# Patient Record
Sex: FEMALE | Race: WHITE | Employment: UNEMPLOYED | URBAN - METROPOLITAN AREA
[De-identification: names, ages, dates, MRNs, and addresses within clinical notes are randomized per-mention and may not be internally consistent; named-entity substitution may affect disease eponyms.]

---

## 2017-06-02 ENCOUNTER — HOSPITAL ENCOUNTER (EMERGENCY)
Age: 27
Discharge: HOME | End: 2017-06-02
Attending: EMERGENCY MEDICINE

## 2017-06-02 VITALS
RESPIRATION RATE: 18 BRPM | SYSTOLIC BLOOD PRESSURE: 131 MMHG | OXYGEN SATURATION: 98 % | HEART RATE: 80 BPM | HEIGHT: 61 IN | DIASTOLIC BLOOD PRESSURE: 86 MMHG | TEMPERATURE: 98.1 F | BODY MASS INDEX: 24.55 KG/M2 | WEIGHT: 130 LBS

## 2017-06-02 DIAGNOSIS — S02.5XXA CLOSED FRACTURE OF TOOTH, INITIAL ENCOUNTER: ICD-10-CM

## 2017-06-02 DIAGNOSIS — K08.89 PAIN, DENTAL: Primary | ICD-10-CM

## 2017-06-02 PROCEDURE — 99282 EMERGENCY DEPT VISIT SF MDM: CPT

## 2017-06-02 RX ORDER — TRAMADOL HYDROCHLORIDE 50 MG/1
50 TABLET ORAL EVERY 6 HOURS PRN
Qty: 12 TABLET | Refills: 0 | Status: SHIPPED | OUTPATIENT
Start: 2017-06-02 | End: 2020-02-01 | Stop reason: ALTCHOICE

## 2017-06-02 RX ORDER — PENICILLIN V POTASSIUM 500 MG/1
500 TABLET ORAL 3 TIMES DAILY
Qty: 30 TABLET | Refills: 0 | Status: SHIPPED | OUTPATIENT
Start: 2017-06-02 | End: 2017-06-12

## 2017-06-02 RX ORDER — NAPROXEN 500 MG/1
500 TABLET ORAL 2 TIMES DAILY
Qty: 20 TABLET | Refills: 0 | Status: SHIPPED | OUTPATIENT
Start: 2017-06-02 | End: 2020-02-01 | Stop reason: ALTCHOICE

## 2017-06-02 ASSESSMENT — PAIN DESCRIPTION - PAIN TYPE: TYPE: ACUTE PAIN

## 2017-06-02 ASSESSMENT — PAIN SCALES - GENERAL: PAINLEVEL_OUTOF10: 6

## 2017-06-02 ASSESSMENT — PAIN DESCRIPTION - LOCATION: LOCATION: TEETH

## 2017-06-02 ASSESSMENT — PAIN DESCRIPTION - DESCRIPTORS: DESCRIPTORS: ACHING;THROBBING

## 2017-10-22 ENCOUNTER — HOSPITAL ENCOUNTER (EMERGENCY)
Age: 27
Discharge: HOME | End: 2017-10-22
Attending: EMERGENCY MEDICINE

## 2017-10-22 VITALS
SYSTOLIC BLOOD PRESSURE: 135 MMHG | TEMPERATURE: 98.1 F | RESPIRATION RATE: 16 BRPM | HEART RATE: 74 BPM | WEIGHT: 125 LBS | BODY MASS INDEX: 23.62 KG/M2 | DIASTOLIC BLOOD PRESSURE: 103 MMHG | OXYGEN SATURATION: 100 %

## 2017-10-22 DIAGNOSIS — T40.1X1A ACCIDENTAL OVERDOSE OF HEROIN, INITIAL ENCOUNTER (HCC): Primary | ICD-10-CM

## 2017-10-22 PROCEDURE — 99283 EMERGENCY DEPT VISIT LOW MDM: CPT

## 2017-10-22 RX ORDER — ONDANSETRON 2 MG/ML
4 INJECTION INTRAMUSCULAR; INTRAVENOUS ONCE
Status: DISCONTINUED | OUTPATIENT
Start: 2017-10-22 | End: 2017-10-22 | Stop reason: HOSPADM

## 2017-10-22 ASSESSMENT — ENCOUNTER SYMPTOMS
VOMITING: 0
COUGH: 0
SHORTNESS OF BREATH: 0
ABDOMINAL PAIN: 0
NAUSEA: 0
DIARRHEA: 0

## 2017-10-22 NOTE — ED NOTES
Bed: 15  Expected date: 10/22/17  Expected time: 2:18 PM  Means of arrival: [facility]  Comments:  32 F - OD. A&O x4. Given 4mg Narcan nasally and 2mg IV.       Alondra Jimenez RN  10/1990

## 2017-10-22 NOTE — ED PROVIDER NOTES
[address] [facility] ED  eMERGENCY dEPARTMENT eNCOUnter      Pt Name: Huy Najera  MRN: [id number]  Олегgflata 1990  Date of evaluation: 10/22/2017  Provider: Dominik Licona [id number]       Chief Complaint   Patient presents with    Drug Overdose         HISTORY OF PRESENT ILLNESS  (Location/Symptom, Timing/Onset, Context/Setting, Quality, Duration, Modifying Factors, Severity.)   Huy Najera is a 32 y.o. female who presents to the emergency department After heroin overdose. Patient admits to recreational use of heroin. She was found apneic and when EMS arrived they did place an intravenous line and administered Narcan. After the Narcan the patient was immediately responsive. She arrives to the emergency department alert and oriented ×4 and is refusing treatment. She admits to this happening in the past and states \"I know my rights I want to leave. \"    Hospitals in [place]    Nursing Notes were reviewed and I agree. REVIEW OF SYSTEMS    (2-9 systems for level 4, 10 or more for level 5)     Review of Systems   Constitutional: Negative for chills, diaphoresis, fatigue and fever. HENT: Negative for congestion. Respiratory: Negative for cough and shortness of breath. Cardiovascular: Negative for chest pain and palpitations. Gastrointestinal: Negative for abdominal pain, diarrhea, nausea and vomiting. Genitourinary: Negative for dysuria and flank pain. Skin: Negative for rash. Neurological: Negative for dizziness, light-headedness and headaches. Except as noted above the remainder of the review of systems was reviewed and negative.        PAST MEDICAL HISTORY     Past Medical History:   Diagnosis Date    Anxiety     Chronic back pain     Ovarian cyst          SURGICAL HISTORY       Past Surgical History:   Procedure Laterality Date    BREAST SURGERY      mastitis    DILATION AND CURETTAGE OF UTERUS  9-2014    LAPAROSCOPY  1-2015         CURRENT MEDICATIONS Neurological: She is alert and oriented to person, place, and time. She has normal strength. Skin: Skin is warm, dry and intact. No rash noted. She is not diaphoretic. Nursing note and vitals reviewed. DIAGNOSTIC RESULTS     RADIOLOGY:   Non-plain film images such as CT, Ultrasound and MRI are read by the radiologist. Plain radiographic images are visualized and preliminarily interpreted by Kaden Hawkins CNP with the below findings:      Interpretation per the Radiologist below, if available at the time of this note:    No orders to display       LABS:  Labs Reviewed - No data to display    All other labs were within normal range or not returned as of this dictation. EMERGENCY DEPARTMENT COURSE and DIFFERENTIAL DIAGNOSIS/MDM:   Vitals:    Vitals:    10/22/17 1422   BP: (!) 135/103   Pulse: 74   Resp: 16   Temp: 98.1 °F (36.7 °C)   TempSrc: Oral   SpO2: 100%   Weight: 125 lb (56.7 kg)       ED Course        MDM on evaluation the patient is nontoxic and in no distress ambulating throughout the room. She does allow physical examination but refuses further evaluation or monitoring. She has already called for a sober party to come pick her up. We discussed observation in the emergency department and the patient refuses this or any type of medical evaluation. Patient does state that she knows the right and this is happened in the past.  She does wish to leave and is agreeable to waiting for a ride to come pick her up. Risks up to and including death were discussed with the patient, she verbalizes understanding of these risks. She'll be discharged against medical advice. PROCEDURES:    Procedures      FINAL IMPRESSION      1.  Accidental overdose of heroin, initial encounter          DISPOSITION/PLAN   DISPOSITION AMA    PATIENT REFERRED TO:  [facility]  [address] [facility] [address]  In 1 day      [id number]  [phone]  In 1 day        DISCHARGE MEDICATIONS:  New Prescriptions    No medications on file       (Please note that portions of this note were completed with a voice recognition program.  Efforts were made to edit the dictations but occasionally words are mis-transcribed.)    Char Hall, [address] [facility] [address], [place]  10/22/17 1436    Attending Supervisory Note/Shared Visit   I have personally performed a face to face diagnostic evaluation on this patient. I have reviewed the mid-levels findings and agree.   History and Exam by me shows Heroin overdose refusing treatment      Cheli Cote DO  Attending Emergency Physician         Cheli Cote DO  10/22/17 8146

## 2020-02-01 ENCOUNTER — HOSPITAL ENCOUNTER (EMERGENCY)
Age: 30
Discharge: HOME | End: 2020-02-01
Attending: STUDENT IN AN ORGANIZED HEALTH CARE EDUCATION/TRAINING PROGRAM
Payer: COMMERCIAL

## 2020-02-01 VITALS
HEART RATE: 82 BPM | HEIGHT: 62 IN | SYSTOLIC BLOOD PRESSURE: 125 MMHG | OXYGEN SATURATION: 97 % | BODY MASS INDEX: 22.08 KG/M2 | WEIGHT: 120 LBS | RESPIRATION RATE: 17 BRPM | TEMPERATURE: 98.4 F | DIASTOLIC BLOOD PRESSURE: 58 MMHG

## 2020-02-01 PROCEDURE — 99282 EMERGENCY DEPT VISIT SF MDM: CPT

## 2020-02-01 PROCEDURE — 6370000000 HC RX 637 (ALT 250 FOR IP): Performed by: STUDENT IN AN ORGANIZED HEALTH CARE EDUCATION/TRAINING PROGRAM

## 2020-02-01 RX ORDER — TRAMADOL HYDROCHLORIDE 50 MG/1
50 TABLET ORAL EVERY 6 HOURS PRN
Qty: 12 TABLET | Refills: 0 | Status: SHIPPED | OUTPATIENT
Start: 2020-02-01 | End: 2020-02-04

## 2020-02-01 RX ORDER — NAPROXEN 500 MG/1
500 TABLET ORAL 2 TIMES DAILY
Qty: 20 TABLET | Refills: 0 | Status: SHIPPED | OUTPATIENT
Start: 2020-02-01 | End: 2020-12-04

## 2020-02-01 RX ORDER — CLINDAMYCIN HYDROCHLORIDE 150 MG/1
450 CAPSULE ORAL ONCE
Status: COMPLETED | OUTPATIENT
Start: 2020-02-01 | End: 2020-02-01

## 2020-02-01 RX ORDER — CLINDAMYCIN HYDROCHLORIDE 150 MG/1
300 CAPSULE ORAL 4 TIMES DAILY
Qty: 80 CAPSULE | Refills: 0 | Status: SHIPPED | OUTPATIENT
Start: 2020-02-01 | End: 2020-02-11

## 2020-02-01 RX ADMIN — CLINDAMYCIN HYDROCHLORIDE 450 MG: 150 CAPSULE ORAL at 15:38

## 2020-02-01 ASSESSMENT — ENCOUNTER SYMPTOMS
FACIAL SWELLING: 1
CHEST TIGHTNESS: 0
ABDOMINAL PAIN: 0
SHORTNESS OF BREATH: 0
VOMITING: 0
COUGH: 0
DIARRHEA: 0
SINUS PRESSURE: 0
BACK PAIN: 0
TROUBLE SWALLOWING: 0

## 2020-02-01 ASSESSMENT — PAIN SCALES - WONG BAKER: WONGBAKER_NUMERICALRESPONSE: 2

## 2020-02-01 ASSESSMENT — PAIN DESCRIPTION - PAIN TYPE: TYPE: ACUTE PAIN

## 2020-02-01 ASSESSMENT — PAIN - FUNCTIONAL ASSESSMENT: PAIN_FUNCTIONAL_ASSESSMENT: PREVENTS OR INTERFERES SOME ACTIVE ACTIVITIES AND ADLS

## 2020-02-01 ASSESSMENT — PAIN DESCRIPTION - FREQUENCY: FREQUENCY: CONTINUOUS

## 2020-02-01 ASSESSMENT — PAIN DESCRIPTION - LOCATION: LOCATION: FACE

## 2020-02-01 ASSESSMENT — PAIN SCALES - GENERAL: PAINLEVEL_OUTOF10: 6

## 2020-02-01 ASSESSMENT — PAIN DESCRIPTION - DESCRIPTORS: DESCRIPTORS: CONSTANT

## 2020-02-01 ASSESSMENT — PAIN DESCRIPTION - ORIENTATION: ORIENTATION: RIGHT

## 2020-02-01 ASSESSMENT — PAIN DESCRIPTION - ONSET: ONSET: ON-GOING

## 2020-02-01 ASSESSMENT — PAIN DESCRIPTION - PROGRESSION: CLINICAL_PROGRESSION: GRADUALLY WORSENING

## 2020-02-01 NOTE — ED NOTES
Pt has swelling noted to right side of face; she states this started last night. Mucous membranes pink, moist, and intact. Lungs clear; heart sounds regular.      Boris Solomon RN  02/01/20 2586

## 2020-02-01 NOTE — ED PROVIDER NOTES
systems reviewed and are negative. Except as noted above the remainder of the review of systems was reviewed and negative. PAST MEDICAL HISTORY     Past Medical History:   Diagnosis Date    Anxiety     Chronic back pain     Ovarian cyst          SURGICALHISTORY       Past Surgical History:   Procedure Laterality Date    BREAST SURGERY      mastitis    DILATION AND CURETTAGE OF UTERUS  9-2014    LAPAROSCOPY  1-2015         CURRENT MEDICATIONS       Discharge Medication List as of 2/1/2020  3:48 PM          ALLERGIES     Patient has no known allergies.     FAMILY HISTORY       Family History   Problem Relation Age of Onset    Cancer Paternal Grandmother         lung    High Blood Pressure Father     High Blood Pressure Maternal Grandmother     High Cholesterol Maternal Grandmother     High Blood Pressure Maternal Grandfather     High Cholesterol Maternal Grandfather           SOCIAL HISTORY       Social History     Socioeconomic History    Marital status: Single     Spouse name: None    Number of children: None    Years of education: None    Highest education level: None   Occupational History    None   Social Needs    Financial resource strain: None    Food insecurity:     Worry: None     Inability: None    Transportation needs:     Medical: None     Non-medical: None   Tobacco Use    Smoking status: Current Every Day Smoker     Packs/day: 0.50     Types: Cigarettes    Smokeless tobacco: Never Used   Substance and Sexual Activity    Alcohol use: No     Alcohol/week: 0.0 standard drinks    Drug use: Yes     Types: Opiates      Comment: heroin    Sexual activity: None   Lifestyle    Physical activity:     Days per week: None     Minutes per session: None    Stress: None   Relationships    Social connections:     Talks on phone: None     Gets together: None     Attends Denominational service: None     Active member of club or organization: None     Attends meetings of clubs or organizations: None     Relationship status: None    Intimate partner violence:     Fear of current or ex partner: None     Emotionally abused: None     Physically abused: None     Forced sexual activity: None   Other Topics Concern    None   Social History Narrative    None       SCREENINGS      @FLOW([id number])@      PHYSICAL EXAM    (up to 7 for level 4, 8 or more for level 5)     ED Triage Vitals   BP Temp Temp Source Pulse Resp SpO2 Height Weight   02/01/20 1434 02/01/20 1434 02/01/20 1434 02/01/20 1434 02/01/20 1434 02/01/20 1434 02/01/20 1539 02/01/20 1539   (!) 125/58 98.4 °F (36.9 °C) Oral 82 17 97 % 5' 2\" (1.575 m) 120 lb (54.4 kg)       Physical Exam  Vitals signs and nursing note reviewed. Constitutional:       General: She is awake. She is not in acute distress. Appearance: Normal appearance. She is well-developed and normal weight. She is not ill-appearing, toxic-appearing or diaphoretic. Comments: No photophobia. No phonophobia. HENT:      Head: Normocephalic and atraumatic. No Germain's sign. Right Ear: External ear normal.      Left Ear: External ear normal.      Nose: Nose normal. No congestion or rhinorrhea. Mouth/Throat:      Lips: Pink. Mouth: Mucous membranes are moist.      Dentition: Dental tenderness, gingival swelling and dental caries present. No gum lesions. Pharynx: Oropharynx is clear. No oropharyngeal exudate or posterior oropharyngeal erythema. Eyes:      General: No scleral icterus. Right eye: No foreign body or discharge. Left eye: No discharge. Extraocular Movements: Extraocular movements intact. Conjunctiva/sclera: Conjunctivae normal.      Left eye: No exudate. Pupils: Pupils are equal, round, and reactive to light. Neck:      Musculoskeletal: Normal range of motion and neck supple. No neck rigidity. Vascular: No JVD. Trachea: No tracheal deviation. Comments: No meningismus.   Cardiovascular: Rate and Rhythm: Normal rate and regular rhythm. Heart sounds: Normal heart sounds. Heart sounds not distant. No murmur. No friction rub. No gallop. Pulmonary:      Effort: Pulmonary effort is normal. No respiratory distress. Breath sounds: Normal breath sounds. No stridor. No wheezing, rhonchi or rales. Chest:      Chest wall: No tenderness. Abdominal:      General: Abdomen is flat. Bowel sounds are normal. There is no distension. Palpations: Abdomen is soft. There is no mass. Tenderness: There is no abdominal tenderness. There is no right CVA tenderness, left CVA tenderness, guarding or rebound. Hernia: No hernia is present. Musculoskeletal: Normal range of motion. General: No swelling, tenderness, deformity or signs of injury. Lymphadenopathy:      Head:      Right side of head: No submental adenopathy. Left side of head: No submental adenopathy. Skin:     General: Skin is warm and dry. Capillary Refill: Capillary refill takes less than 2 seconds. Coloration: Skin is not jaundiced or pale. Findings: No bruising, erythema, lesion or rash. Neurological:      General: No focal deficit present. Mental Status: She is alert and oriented to person, place, and time. Mental status is at baseline. Cranial Nerves: No cranial nerve deficit. Sensory: No sensory deficit. Motor: No weakness. Coordination: Coordination normal.      Deep Tendon Reflexes: Reflexes are normal and symmetric. Psychiatric:         Mood and Affect: Mood normal.         Behavior: Behavior normal. Behavior is cooperative. Thought Content:  Thought content normal.         Judgment: Judgment normal.         DIAGNOSTIC RESULTS     EKG: All EKG's are interpreted by the Emergency Department Physician who either signs or Co-signsthis chart in the absence of a cardiologist.        RADIOLOGY:   Non-plain filmimages such as CT, Ultrasound and MRI are read by the radiologist. Dario Kiran radiographic images are visualized and preliminarily interpreted by the emergency physician with the below findings:        Interpretation per the Radiologist below, if available at the time ofthis note:    No orders to display         ED BEDSIDE ULTRASOUND:   Performed by ED Physician - none    LABS:  Labs Reviewed - No data to display    All other labs were within normal range or not returned as of this dictation. EMERGENCY DEPARTMENT COURSE and DIFFERENTIAL DIAGNOSIS/MDM:   Vitals:    Vitals:    02/01/20 1434 02/01/20 1539   BP: (!) 125/58    Pulse: 82    Resp: 17    Temp: 98.4 °F (36.9 °C)    TempSrc: Oral    SpO2: 97%    Weight:  120 lb (54.4 kg)   Height:  5' 2\" (1.575 m)           MDM  Clinical pharmacy consult was obtained with Juan Francisco Lopes. Patient started on 450 mg of clindamycin in the ED orally and then she will switch to 300 mg every 6 hours for 10 days. Patient was advised if her symptoms are worsening such as drooling unable to swallow face swelling more that she is to return to the ER. The patient verbalized understanding care that was discussed with her on reexamination. She has no further questions. CONSULTS:  None    PROCEDURES:  Unless otherwise noted below, none     Procedures    FINAL IMPRESSION      1. Gingival abscess    2. Infected dental carries          DISPOSITION/PLAN   DISPOSITION Decision To Discharge 02/01/2020 03:45:10 PM      PATIENT REFERRED TO:  [facility]  [address] [facility] [id number] [phone]  Call in 2 days        DISCHARGE MEDICATIONS:  Discharge Medication List as of 2/1/2020  3:48 PM      START taking these medications    Details   clindamycin (CLEOCIN) 150 MG capsule Take 2 capsules by mouth 4 times daily for 10 days, Disp-80 capsule, R-0Print      traMADol (ULTRAM) 50 MG tablet Take 1 tablet by mouth every 6 hours as needed for Pain for up to 3 days. Intended supply: 3 days.  Take lowest dose possible to manage pain, Disp-12 tablet, R-0Print      naproxen (NAPROSYN) 500 MG tablet Take 1 tablet by mouth 2 times daily for 20 doses, Disp-20 tablet, R-0Print                (Please note that portions of this note were completed with a voice recognition program.  Efforts were made to edit the dictations but occasionally words are mis-transcribed.)    Mehdi Bear DO (electronically signed)  Attending Emergency Physician          Mehdi Bear DO  02/01/20 8622

## 2020-11-29 ENCOUNTER — HOSPITAL ENCOUNTER (EMERGENCY)
Age: 30
Discharge: HOME | End: 2020-11-29
Payer: COMMERCIAL

## 2020-11-29 ENCOUNTER — APPOINTMENT (OUTPATIENT)
Dept: GENERAL RADIOLOGY | Age: 30
End: 2020-11-29
Payer: COMMERCIAL

## 2020-11-29 VITALS
BODY MASS INDEX: 22.82 KG/M2 | HEIGHT: 62 IN | HEART RATE: 106 BPM | TEMPERATURE: 99.9 F | WEIGHT: 124 LBS | SYSTOLIC BLOOD PRESSURE: 120 MMHG | RESPIRATION RATE: 18 BRPM | DIASTOLIC BLOOD PRESSURE: 72 MMHG | OXYGEN SATURATION: 96 %

## 2020-11-29 PROCEDURE — 99284 EMERGENCY DEPT VISIT MOD MDM: CPT

## 2020-11-29 PROCEDURE — 71045 X-RAY EXAM CHEST 1 VIEW: CPT

## 2020-11-29 ASSESSMENT — ENCOUNTER SYMPTOMS
NAUSEA: 0
VOMITING: 0
ABDOMINAL PAIN: 0
WHEEZING: 0
SHORTNESS OF BREATH: 0
COUGH: 0
PHOTOPHOBIA: 0

## 2020-11-29 NOTE — ED TRIAGE NOTES
Pt [facility] with c/o accidental overdose. Pt states she took one to many percocets (5). Pt was given 2 mg of narcan and woke right up. Pt is A&O x 4. Pt has blood under her right nostril ( pt states that it is old and she had a scab). Pt's skin is p/w/d.  Pt's breathing and respirations are unlabored and equal.

## 2020-11-29 NOTE — ED PROVIDER NOTES
and headaches. All other systems reviewed and are negative. Except as noted above the remainder of the review of systems was reviewed and negative. PAST MEDICAL HISTORY     Past Medical History:   Diagnosis Date    Anxiety     Chronic back pain     Ovarian cyst          SURGICAL HISTORY       Past Surgical History:   Procedure Laterality Date    BREAST SURGERY      mastitis    DILATION AND CURETTAGE OF UTERUS  9-2014    LAPAROSCOPY  1-2015         CURRENT MEDICATIONS       Discharge Medication List as of 11/29/2020  7:00 PM      CONTINUE these medications which have NOT CHANGED    Details   naproxen (NAPROSYN) 500 MG tablet Take 1 tablet by mouth 2 times daily for 20 doses, Disp-20 tablet, R-0Print             ALLERGIES     Patient has no known allergies.     FAMILY HISTORY       Family History   Problem Relation Age of Onset    Cancer Paternal Grandmother         lung    High Blood Pressure Father     High Blood Pressure Maternal Grandmother     High Cholesterol Maternal Grandmother     High Blood Pressure Maternal Grandfather     High Cholesterol Maternal Grandfather           SOCIAL HISTORY       Social History     Socioeconomic History    Marital status: Single     Spouse name: None    Number of children: None    Years of education: None    Highest education level: None   Occupational History    None   Social Needs    Financial resource strain: None    Food insecurity     Worry: None     Inability: None    Transportation needs     Medical: None     Non-medical: None   Tobacco Use    Smoking status: Current Every Day Smoker     Packs/day: 0.50     Types: Cigarettes    Smokeless tobacco: Never Used   Substance and Sexual Activity    Alcohol use: No     Alcohol/week: 0.0 standard drinks    Drug use: Yes     Types: Opiates      Comment: heroin    Sexual activity: None   Lifestyle    Physical activity     Days per week: None     Minutes per session: None    Stress: None Relationships    Social connections     Talks on phone: None     Gets together: None     Attends Orthodox service: None     Active member of club or organization: None     Attends meetings of clubs or organizations: None     Relationship status: None    Intimate partner violence     Fear of current or ex partner: None     Emotionally abused: None     Physically abused: None     Forced sexual activity: None   Other Topics Concern    None   Social History Narrative    None       SCREENINGS                        PHYSICAL EXAM    (up to 7 for level 4, 8 or more for level 5)     ED Triage Vitals [11/29/20 1813]   BP Temp Temp Source Pulse Resp SpO2 Height Weight   120/72 99.9 °F (37.7 °C) Oral 106 18 96 % 5' 2\" (1.575 m) 124 lb (56.2 kg)       Physical Exam  Constitutional:       General: She is not in acute distress. Appearance: She is well-developed. She is not ill-appearing, toxic-appearing or diaphoretic. HENT:      Head: Normocephalic and atraumatic. Nose: Nose normal.      Mouth/Throat:      Mouth: Mucous membranes are moist.   Eyes:      Pupils: Pupils are equal, round, and reactive to light. Neck:      Musculoskeletal: Normal range of motion. Cardiovascular:      Rate and Rhythm: Normal rate and regular rhythm. Pulses: Normal pulses. Heart sounds: No murmur. No friction rub. No gallop. Pulmonary:      Effort: Pulmonary effort is normal. No respiratory distress. Breath sounds: Normal breath sounds. No wheezing, rhonchi or rales. Abdominal:      General: Bowel sounds are normal. There is no distension. Palpations: Abdomen is soft. Tenderness: There is no abdominal tenderness. There is no guarding or rebound. Musculoskeletal:         General: No swelling. Skin:     General: Skin is warm and dry. Capillary Refill: Capillary refill takes less than 2 seconds. Neurological:      General: No focal deficit present.       Mental Status: She is alert and oriented to person, place, and time. GCS: GCS eye subscore is 4. GCS verbal subscore is 5. GCS motor subscore is 6. Cranial Nerves: Cranial nerves are intact. Sensory: Sensation is intact. Motor: Motor function is intact. Coordination: Coordination is intact. Gait: Gait is intact. DIAGNOSTIC RESULTS     EKG: All EKG's are interpreted by the Emergency Department Physician who either signs or Co-signs this chart in the absence of a cardiologist.        RADIOLOGY:   Non-plain film images such as CT, Ultrasound and MRI are read by the radiologist. Plain radiographic images are visualized and preliminarily interpreted by the emergency physician with the below findings:        Interpretation per the Radiologist below, if available at the time of this note:    XR CHEST PORTABLE    (Results Pending)         ED BEDSIDE ULTRASOUND:   Performed by ED Physician - none    LABS:  Labs Reviewed - No data to display    All other labs were within normal range or not returned as of this dictation. EMERGENCY DEPARTMENT COURSE and DIFFERENTIAL DIAGNOSIS/MDM:   Vitals:    Vitals:    11/29/20 1813   BP: 120/72   Pulse: 106   Resp: 18   Temp: 99.9 °F (37.7 °C)   TempSrc: Oral   SpO2: 96%   Weight: 124 lb (56.2 kg)   Height: 5' 2\" (1.575 m)     MDM     Pt is a 26 yo F who presents to the ED with accidental drug overdose on percocet. She is afebrile and hemodynamically stable. CXR is negative for acute abnormality. Pt observed in the ED, uneventful. Remains neurologically intact with stable vitals. Stable for discharge. F/u with pcp in 1 day or return to the ED for worsening sx. Given warning signs for which she should return. Pt understands and agrees to plan, all questions answered. REASSESSMENT          CRITICAL CARE TIME   Total Critical Care time was 0 minutes, excluding separately reportable procedures.   There was a high probability of clinically significant/life threatening deterioration in the patient's condition which required my urgent intervention. CONSULTS:  None    PROCEDURES:  Unless otherwise noted below, none     Procedures        FINAL IMPRESSION      1. Accidental drug overdose, initial encounter          DISPOSITION/PLAN   DISPOSITION Decision To Discharge 11/29/2020 07:00:10 PM      PATIENT REFERRED TO:  Maik Escamilla, APRN - CNP  [address] [facility]  [phone]    Schedule an appointment as soon as possible for a visit in 1 day      [facility]) ED  [address] [address]  [phone]  Go to   As needed, If symptoms worsen      DISCHARGE MEDICATIONS:  Discharge Medication List as of 11/29/2020  7:00 PM        Controlled Substances Monitoring:     No flowsheet data found.     (Please note that portions of this note were completed with a voice recognition program.  Efforts were made to edit the dictations but occasionally words are mis-transcribed.)    Saloni Salazar PA-C (electronically signed)             Saloni Salazar PA-C  11/29/20 1916

## 2020-11-30 NOTE — ED NOTES
Pt understands discharge instructions.   Pt instructed to follow up with PCP   Prescriptions explained   Pt told to come back for new or worsening symptoms  No further questions         Fanta Barahona RN  11/29/20 4821

## 2020-12-04 ENCOUNTER — OFFICE VISIT (OUTPATIENT)
Dept: OBGYN CLINIC | Age: 30
End: 2020-12-04
Payer: COMMERCIAL

## 2020-12-04 VITALS
WEIGHT: 108 LBS | BODY MASS INDEX: 19.88 KG/M2 | SYSTOLIC BLOOD PRESSURE: 112 MMHG | DIASTOLIC BLOOD PRESSURE: 72 MMHG | HEIGHT: 62 IN

## 2020-12-04 PROCEDURE — G8420 CALC BMI NORM PARAMETERS: HCPCS | Performed by: ADVANCED PRACTICE MIDWIFE

## 2020-12-04 PROCEDURE — 99203 OFFICE O/P NEW LOW 30 MIN: CPT | Performed by: ADVANCED PRACTICE MIDWIFE

## 2020-12-04 PROCEDURE — G8484 FLU IMMUNIZE NO ADMIN: HCPCS | Performed by: ADVANCED PRACTICE MIDWIFE

## 2020-12-04 PROCEDURE — 99385 PREV VISIT NEW AGE 18-39: CPT | Performed by: ADVANCED PRACTICE MIDWIFE

## 2020-12-04 PROCEDURE — G8427 DOCREV CUR MEDS BY ELIG CLIN: HCPCS | Performed by: ADVANCED PRACTICE MIDWIFE

## 2020-12-04 PROCEDURE — 1036F TOBACCO NON-USER: CPT | Performed by: ADVANCED PRACTICE MIDWIFE

## 2020-12-04 RX ORDER — METRONIDAZOLE 500 MG/1
500 TABLET ORAL 2 TIMES DAILY
Qty: 14 TABLET | Refills: 1 | Status: SHIPPED | OUTPATIENT
Start: 2020-12-04 | End: 2020-12-11

## 2020-12-04 ASSESSMENT — PATIENT HEALTH QUESTIONNAIRE - PHQ9
1. LITTLE INTEREST OR PLEASURE IN DOING THINGS: 0
SUM OF ALL RESPONSES TO PHQ QUESTIONS 1-9: 0
2. FEELING DOWN, DEPRESSED OR HOPELESS: 0
SUM OF ALL RESPONSES TO PHQ9 QUESTIONS 1 & 2: 0
SUM OF ALL RESPONSES TO PHQ QUESTIONS 1-9: 0
SUM OF ALL RESPONSES TO PHQ QUESTIONS 1-9: 0

## 2020-12-04 NOTE — PROGRESS NOTES
Chief Complaint:     Ivanna Sanchez is a 27 y.o. female who presents here today for complaints of:      Chief Complaint   Patient presents with    New Patient    Dyspareunia     pt unable to feel the strings pt only had for two yrs last pap 2 yrs ago     History of Present Illness:     Ivanna Sanchez is a 27 y.o. female who presents for her annual exam.    · Concerns today:  Discomfort with intercourse, unable to locate IUD strings  · Prior Pap History:    · 12/19/14 - NILM  · Current contraceptive Method:  IUD, does not desire pregnancy within the next 12 months. Is there a possibility you could be pregnant? No  · Sexual health:    · Use of barrier protection - Yes  · Exposure to a partner with a known sexually transmitted disease within the last 90 days - No  · Gender of sexual partners - Male  · Number of sexual contacts within the past 12 months:  1  · Personal past history  of sexually transmitted diseases:  No  · Desires screening for sexually transmitted diseases:  Yes    IUD Surveillance - started a hormonal contraceptive method to relieve uncomfortable menstrual symptoms.  Currently using a Mirena IUD (placed 9/14/18), happy with method, but experiencing dyspareunia and feels it may be related to her IUD. She is unable to palpate IUD strings.  Since her IUD was placed, menses are now described as absent/minimal.  Denies mid-cycle spotting/bleeding. Denies postcoital bleeding.  Activities that aggravate:  None   She does not desire pregnancy within 12 months.  Risk Factors - Smoking status:  Former smoker. Denies history of hypertension, pulmonary embolus, venous thrombosis, and migraine headaches with aura.  Discussed expected changes in menstrual bleeding, side effects, non-contraceptive benefits, and the effect on future futility. Past Medical, Surgical, and Family History: Allergies:  Patient has no known allergies. No LMP recorded.  Patient has had an implant. Obstetrical History:  [id number]     Past Medical History:   Diagnosis Date    Anxiety     Chronic back pain     LGSIL on Pap smear of cervix 12/4/2020    Ovarian cyst      Past Surgical History:   Procedure Laterality Date    BREAST SURGERY      mastitis    DILATION AND CURETTAGE OF UTERUS  9-2014    LAPAROSCOPY  1-2015     Family History   Problem Relation Age of Onset    Cancer Paternal Grandmother         lung    High Blood Pressure Father     High Blood Pressure Maternal Grandmother     High Cholesterol Maternal Grandmother     High Blood Pressure Maternal Grandfather     High Cholesterol Maternal Grandfather      Medications:     No current outpatient medications on file prior to visit. No current facility-administered medications on file prior to visit. Review of Systems:     Review of Systems   Constitutional: Negative for activity change, appetite change, chills, diaphoresis, fatigue, fever and unexpected weight change. HENT: Negative for congestion, postnasal drip, rhinorrhea, sneezing, sore throat, trouble swallowing and voice change. Respiratory: Negative for cough and shortness of breath. Cardiovascular: Negative for chest pain. Gastrointestinal: Negative for abdominal pain, constipation, diarrhea, nausea and vomiting. Genitourinary: Negative for difficulty urinating, dyspareunia, dysuria, frequency, genital sores, menstrual problem, pelvic pain, vaginal bleeding, vaginal discharge and vaginal pain. Musculoskeletal: Negative for arthralgias and myalgias. Neurological: Negative for dizziness, syncope and headaches. Hematological: Negative for adenopathy. All other systems reviewed and are negative. Physical Exam:     Vitals:  /72   Ht 5' 2\" (1.575 m)   Wt 108 lb (49 kg)   BMI 19.75 kg/m²     Physical Exam  Vitals signs and nursing note reviewed. Constitutional:       General: She is not in acute distress.      Appearance: Normal mass, tenderness or fullness. Rectum: Normal. No mass or external hemorrhoid. Musculoskeletal: Normal range of motion. General: No swelling or deformity. Right lower leg: No edema. Left lower leg: No edema. Lymphadenopathy:      Cervical: No cervical adenopathy. Upper Body:      Right upper body: No supraclavicular, axillary or pectoral adenopathy. Left upper body: No supraclavicular, axillary or pectoral adenopathy. Lower Body: No right inguinal adenopathy. No left inguinal adenopathy. Skin:     General: Skin is warm and dry. Capillary Refill: Capillary refill takes less than 2 seconds. Coloration: Skin is not jaundiced or pale. Neurological:      General: No focal deficit present. Mental Status: She is alert and oriented to person, place, and time. Mental status is at baseline. Cranial Nerves: No cranial nerve deficit. Sensory: No sensory deficit. Motor: No weakness. Coordination: Coordination normal.      Gait: Gait normal.   Psychiatric:         Mood and Affect: Mood normal.         Behavior: Behavior normal.         Thought Content: Thought content normal.         Judgment: Judgment normal.       Assessment:      Diagnosis Orders   1. Pap smear, as part of routine gynecological examination  PAP SMEAR   2. Screening for human papillomavirus  PAP SMEAR   3. Intrauterine device surveillance  US PELVIS COMPLETE   4. Intrauterine contraceptive device threads lost, initial encounter     5. Dyspareunia in female  PAP SMEAR   6. Bacterial vaginitis  metroNIDAZOLE (FLAGYL) 500 MG tablet   7. Vaginal discharge     8. Screen for sexually transmitted diseases  PAP SMEAR   9. Vulval lesion       Plan:     1. Annual Exam  Pelvic exam revealed moderate vaginal discharge  Vaginal cultures and Pap collected    2.  IUD Surveillance, Dyspareunia, Unable to [employer]  Experiencing some dyspareunia, unable to palpate strings  IUD Order Specific Question:   HPV Requested?      Answer:   N/A     Comments:   16/18     Order Specific Question:   High Risk Patient     Answer:   N/A     Orders Placed This Encounter   Medications    metroNIDAZOLE (FLAGYL) 500 MG tablet     Sig: Take 1 tablet by mouth 2 times daily for 7 days     Dispense:  14 tablet     Refill:  [facility]

## 2020-12-14 DIAGNOSIS — N94.10 DYSPAREUNIA IN FEMALE: ICD-10-CM

## 2020-12-14 DIAGNOSIS — Z01.419 PAP SMEAR, AS PART OF ROUTINE GYNECOLOGICAL EXAMINATION: ICD-10-CM

## 2020-12-14 DIAGNOSIS — Z11.51 SCREENING FOR HUMAN PAPILLOMAVIRUS: ICD-10-CM

## 2020-12-14 DIAGNOSIS — Z11.3 SCREEN FOR SEXUALLY TRANSMITTED DISEASES: ICD-10-CM

## 2020-12-14 NOTE — LETTER
[facility]  Dept: [phone]  Dept Fax: [phone]        Yenifer Diaz  [address]  [facility] [place] [address]          12/29/2020    Dear Ms. Veliz: We were unable to reach you by phone. Please call our office at your earliest convenience. This message is regarding: Schedule an appointment. Please call between the hours of 8:30am and 4:00pm Monday through Friday if possible. Our number is [phone]  At the [place] location. Thank you.        Esau ALCANTARA CNM

## 2020-12-18 PROBLEM — R87.612 LGSIL ON PAP SMEAR OF CERVIX: Status: ACTIVE | Noted: 2020-12-04

## 2020-12-18 PROBLEM — R87.810 CERVICAL HIGH RISK HPV (HUMAN PAPILLOMAVIRUS) TEST POSITIVE: Status: ACTIVE | Noted: 2020-12-04

## 2020-12-18 NOTE — RESULT ENCOUNTER NOTE
Pap: LGSIL, HPV positive  Refer for colposcopy    It looks like this may be Kari's first abnormal Pap, please schedule a visit to discuss her results.

## 2020-12-23 ASSESSMENT — ENCOUNTER SYMPTOMS
DIARRHEA: 0
SHORTNESS OF BREATH: 0
COUGH: 0
VOICE CHANGE: 0
RHINORRHEA: 0
VOMITING: 0
TROUBLE SWALLOWING: 0
SORE THROAT: 0
CONSTIPATION: 0
ABDOMINAL PAIN: 0
NAUSEA: 0

## 2020-12-28 ENCOUNTER — TELEPHONE (OUTPATIENT)
Dept: OBGYN CLINIC | Age: 30
End: 2020-12-28

## 2020-12-28 NOTE — TELEPHONE ENCOUNTER
----- Message from MARICRUZ Dillon CNM sent at 12/18/2020  9:57 AM EST -----  Pap:  LGSIL, HPV positive  Refer for colposcopy    It looks like this may be Kari's first abnormal Pap, please schedule a visit to discuss her results.

## 2020-12-28 NOTE — TELEPHONE ENCOUNTER
Tried reaching  Out to patient to make aware of previous message. I left a total of 3 messages for the patient to contact the office and schedule an appointment with Tamiko White to go over her pap smear.

## 2021-08-12 LAB
ALBUMIN SERPL-MCNC: 3.8 G/DL (ref 3.5–4.6)
ALP BLD-CCNC: 137 U/L (ref 40–130)
ALT SERPL-CCNC: 9 U/L (ref 0–33)
ANION GAP SERPL CALCULATED.3IONS-SCNC: 11 MEQ/L (ref 9–15)
AST SERPL-CCNC: 13 U/L (ref 0–35)
BASOPHILS ABSOLUTE: 0 K/UL (ref 0–0.2)
BASOPHILS RELATIVE PERCENT: 0.3 %
BILIRUB SERPL-MCNC: 0.6 MG/DL (ref 0.2–0.7)
BUN BLDV-MCNC: 7 MG/DL (ref 6–20)
CALCIUM SERPL-MCNC: 8.9 MG/DL (ref 8.5–9.9)
CHLORIDE BLD-SCNC: 96 MEQ/L (ref 95–107)
CO2: 26 MEQ/L (ref 20–31)
CREAT SERPL-MCNC: 0.65 MG/DL (ref 0.5–0.9)
EOSINOPHILS ABSOLUTE: 0 K/UL (ref 0–0.7)
EOSINOPHILS RELATIVE PERCENT: 0.3 %
GFR AFRICAN AMERICAN: >60
GFR NON-AFRICAN AMERICAN: >60
GLOBULIN: 3.4 G/DL (ref 2.3–3.5)
GLUCOSE BLD-MCNC: 124 MG/DL (ref 70–99)
HCG, URINE, POC: NEGATIVE
HCT VFR BLD CALC: 36.1 % (ref 37–47)
HEMOGLOBIN: 12 G/DL (ref 12–16)
LACTIC ACID: 2.3 MMOL/L (ref 0.5–2.2)
LYMPHOCYTES ABSOLUTE: 2.3 K/UL (ref 1–4.8)
LYMPHOCYTES RELATIVE PERCENT: 17.9 %
Lab: NORMAL
MCH RBC QN AUTO: 25.8 PG (ref 27–31.3)
MCHC RBC AUTO-ENTMCNC: 33.2 % (ref 33–37)
MCV RBC AUTO: 77.8 FL (ref 82–100)
MONOCYTES ABSOLUTE: 1.1 K/UL (ref 0.2–0.8)
MONOCYTES RELATIVE PERCENT: 8.8 %
NEGATIVE QC PASS/FAIL: NORMAL
NEUTROPHILS ABSOLUTE: 9.4 K/UL (ref 1.4–6.5)
NEUTROPHILS RELATIVE PERCENT: 72.7 %
PDW BLD-RTO: 15.3 % (ref 11.5–14.5)
PLATELET # BLD: 247 K/UL (ref 130–400)
POSITIVE QC PASS/FAIL: NORMAL
POTASSIUM SERPL-SCNC: 4.1 MEQ/L (ref 3.4–4.9)
RBC # BLD: 4.64 M/UL (ref 4.2–5.4)
SODIUM BLD-SCNC: 133 MEQ/L (ref 135–144)
TOTAL PROTEIN: 7.2 G/DL (ref 6.3–8)
WBC # BLD: 12.9 K/UL (ref 4.8–10.8)

## 2021-08-12 PROCEDURE — 96375 TX/PRO/DX INJ NEW DRUG ADDON: CPT

## 2021-08-12 PROCEDURE — 83605 ASSAY OF LACTIC ACID: CPT

## 2021-08-12 PROCEDURE — 96361 HYDRATE IV INFUSION ADD-ON: CPT

## 2021-08-12 PROCEDURE — 96366 THER/PROPH/DIAG IV INF ADDON: CPT

## 2021-08-12 PROCEDURE — 99284 EMERGENCY DEPT VISIT MOD MDM: CPT

## 2021-08-12 PROCEDURE — 85025 COMPLETE CBC W/AUTO DIFF WBC: CPT

## 2021-08-12 PROCEDURE — 36415 COLL VENOUS BLD VENIPUNCTURE: CPT

## 2021-08-12 PROCEDURE — 96365 THER/PROPH/DIAG IV INF INIT: CPT

## 2021-08-12 PROCEDURE — 80053 COMPREHEN METABOLIC PANEL: CPT

## 2021-08-12 ASSESSMENT — PAIN DESCRIPTION - LOCATION: LOCATION: ARM

## 2021-08-12 ASSESSMENT — PAIN DESCRIPTION - PAIN TYPE: TYPE: ACUTE PAIN

## 2021-08-12 ASSESSMENT — PAIN SCALES - GENERAL: PAINLEVEL_OUTOF10: 7

## 2021-08-12 ASSESSMENT — PAIN DESCRIPTION - ORIENTATION: ORIENTATION: RIGHT;UPPER

## 2021-08-13 ENCOUNTER — APPOINTMENT (OUTPATIENT)
Dept: GENERAL RADIOLOGY | Age: 31
DRG: 710 | End: 2021-08-13
Payer: COMMERCIAL

## 2021-08-13 ENCOUNTER — ANESTHESIA (OUTPATIENT)
Dept: OPERATING ROOM | Age: 31
DRG: 710 | End: 2021-08-13
Payer: COMMERCIAL

## 2021-08-13 ENCOUNTER — ANESTHESIA EVENT (OUTPATIENT)
Dept: OPERATING ROOM | Age: 31
DRG: 710 | End: 2021-08-13
Payer: COMMERCIAL

## 2021-08-13 ENCOUNTER — HOSPITAL ENCOUNTER (INPATIENT)
Age: 31
LOS: 1 days | Discharge: LEFT BEFORE DISCHARGE | DRG: 710 | End: 2021-08-14
Attending: INTERNAL MEDICINE | Admitting: INTERNAL MEDICINE
Payer: COMMERCIAL

## 2021-08-13 VITALS — DIASTOLIC BLOOD PRESSURE: 50 MMHG | SYSTOLIC BLOOD PRESSURE: 92 MMHG | OXYGEN SATURATION: 100 %

## 2021-08-13 DIAGNOSIS — L02.419 CELLULITIS AND ABSCESS OF UPPER EXTREMITY: Primary | ICD-10-CM

## 2021-08-13 DIAGNOSIS — L03.119 CELLULITIS AND ABSCESS OF UPPER EXTREMITY: Primary | ICD-10-CM

## 2021-08-13 DIAGNOSIS — R65.10 SIRS (SYSTEMIC INFLAMMATORY RESPONSE SYNDROME) (HCC): ICD-10-CM

## 2021-08-13 PROBLEM — A41.9 SEPSIS (HCC): Status: ACTIVE | Noted: 2021-08-13

## 2021-08-13 LAB
ALBUMIN SERPL-MCNC: 2.9 G/DL (ref 3.5–4.6)
ALP BLD-CCNC: 110 U/L (ref 40–130)
ALT SERPL-CCNC: 8 U/L (ref 0–33)
AMPHETAMINE SCREEN, URINE: POSITIVE
ANION GAP SERPL CALCULATED.3IONS-SCNC: 11 MEQ/L (ref 9–15)
ANISOCYTOSIS: ABNORMAL
AST SERPL-CCNC: 10 U/L (ref 0–35)
BANDED NEUTROPHILS RELATIVE PERCENT: 9 % (ref 5–11)
BARBITURATE SCREEN URINE: ABNORMAL
BASOPHILS ABSOLUTE: 0 K/UL (ref 0–0.2)
BASOPHILS RELATIVE PERCENT: 0.2 %
BENZODIAZEPINE SCREEN, URINE: ABNORMAL
BILIRUB SERPL-MCNC: 0.4 MG/DL (ref 0.2–0.7)
BUN BLDV-MCNC: 6 MG/DL (ref 6–20)
CALCIUM SERPL-MCNC: 7.7 MG/DL (ref 8.5–9.9)
CANNABINOID SCREEN URINE: ABNORMAL
CHLORIDE BLD-SCNC: 102 MEQ/L (ref 95–107)
CO2: 23 MEQ/L (ref 20–31)
COCAINE METABOLITE SCREEN URINE: ABNORMAL
CREAT SERPL-MCNC: 0.48 MG/DL (ref 0.5–0.9)
EOSINOPHILS ABSOLUTE: 0 K/UL (ref 0–0.7)
EOSINOPHILS RELATIVE PERCENT: 0.5 %
GFR AFRICAN AMERICAN: >60
GFR NON-AFRICAN AMERICAN: >60
GLOBULIN: 3 G/DL (ref 2.3–3.5)
GLUCOSE BLD-MCNC: 122 MG/DL (ref 70–99)
HCT VFR BLD CALC: 29.6 % (ref 37–47)
HEMOGLOBIN: 9.9 G/DL (ref 12–16)
LACTIC ACID, SEPSIS: 0.7 MMOL/L (ref 0.5–1.9)
LACTIC ACID, SEPSIS: 1 MMOL/L (ref 0.5–1.9)
LYMPHOCYTES ABSOLUTE: 2.4 K/UL (ref 1–4.8)
LYMPHOCYTES RELATIVE PERCENT: 17 %
Lab: ABNORMAL
MAGNESIUM: 1.8 MG/DL (ref 1.7–2.4)
MCH RBC QN AUTO: 26.2 PG (ref 27–31.3)
MCHC RBC AUTO-ENTMCNC: 33.6 % (ref 33–37)
MCV RBC AUTO: 78 FL (ref 82–100)
METHADONE SCREEN, URINE: ABNORMAL
MONOCYTES ABSOLUTE: 1.1 K/UL (ref 0.2–0.8)
MONOCYTES RELATIVE PERCENT: 6.8 %
NEUTROPHILS ABSOLUTE: 10.8 K/UL (ref 1.4–6.5)
NEUTROPHILS RELATIVE PERCENT: 67 %
OPIATE SCREEN URINE: ABNORMAL
OXYCODONE URINE: ABNORMAL
PDW BLD-RTO: 15 % (ref 11.5–14.5)
PHENCYCLIDINE SCREEN URINE: ABNORMAL
PLATELET # BLD: 225 K/UL (ref 130–400)
PLATELET SLIDE REVIEW: NORMAL
POIKILOCYTES: ABNORMAL
POTASSIUM REFLEX MAGNESIUM: 3.2 MEQ/L (ref 3.4–4.9)
PROCALCITONIN: 0.5 NG/ML (ref 0–0.15)
PROMONOCYTES: 1 %
PROPOXYPHENE SCREEN: ABNORMAL
RBC # BLD: 3.79 M/UL (ref 4.2–5.4)
SARS-COV-2, NAAT: NOT DETECTED
SODIUM BLD-SCNC: 136 MEQ/L (ref 135–144)
TOTAL PROTEIN: 5.9 G/DL (ref 6.3–8)
WBC # BLD: 14.2 K/UL (ref 4.8–10.8)

## 2021-08-13 PROCEDURE — 7100000000 HC PACU RECOVERY - FIRST 15 MIN: Performed by: SURGERY

## 2021-08-13 PROCEDURE — 87075 CULTR BACTERIA EXCEPT BLOOD: CPT

## 2021-08-13 PROCEDURE — 96366 THER/PROPH/DIAG IV INF ADDON: CPT

## 2021-08-13 PROCEDURE — 83735 ASSAY OF MAGNESIUM: CPT

## 2021-08-13 PROCEDURE — 3600000013 HC SURGERY LEVEL 3 ADDTL 15MIN: Performed by: SURGERY

## 2021-08-13 PROCEDURE — 2580000003 HC RX 258: Performed by: SURGERY

## 2021-08-13 PROCEDURE — 6360000002 HC RX W HCPCS: Performed by: INTERNAL MEDICINE

## 2021-08-13 PROCEDURE — 73060 X-RAY EXAM OF HUMERUS: CPT

## 2021-08-13 PROCEDURE — 1210000000 HC MED SURG R&B

## 2021-08-13 PROCEDURE — 2580000003 HC RX 258: Performed by: STUDENT IN AN ORGANIZED HEALTH CARE EDUCATION/TRAINING PROGRAM

## 2021-08-13 PROCEDURE — 6360000002 HC RX W HCPCS: Performed by: NURSE PRACTITIONER

## 2021-08-13 PROCEDURE — G0378 HOSPITAL OBSERVATION PER HR: HCPCS

## 2021-08-13 PROCEDURE — 96375 TX/PRO/DX INJ NEW DRUG ADDON: CPT

## 2021-08-13 PROCEDURE — 2709999900 HC NON-CHARGEABLE SUPPLY: Performed by: SURGERY

## 2021-08-13 PROCEDURE — 36415 COLL VENOUS BLD VENIPUNCTURE: CPT

## 2021-08-13 PROCEDURE — 6360000002 HC RX W HCPCS: Performed by: SURGERY

## 2021-08-13 PROCEDURE — 3600000003 HC SURGERY LEVEL 3 BASE: Performed by: SURGERY

## 2021-08-13 PROCEDURE — 87040 BLOOD CULTURE FOR BACTERIA: CPT

## 2021-08-13 PROCEDURE — 3700000000 HC ANESTHESIA ATTENDED CARE: Performed by: SURGERY

## 2021-08-13 PROCEDURE — 6370000000 HC RX 637 (ALT 250 FOR IP): Performed by: INTERNAL MEDICINE

## 2021-08-13 PROCEDURE — 87077 CULTURE AEROBIC IDENTIFY: CPT

## 2021-08-13 PROCEDURE — 6370000000 HC RX 637 (ALT 250 FOR IP): Performed by: STUDENT IN AN ORGANIZED HEALTH CARE EDUCATION/TRAINING PROGRAM

## 2021-08-13 PROCEDURE — 10061 I&D ABSCESS COMP/MULTIPLE: CPT | Performed by: SURGERY

## 2021-08-13 PROCEDURE — 87205 SMEAR GRAM STAIN: CPT

## 2021-08-13 PROCEDURE — 3700000001 HC ADD 15 MINUTES (ANESTHESIA): Performed by: SURGERY

## 2021-08-13 PROCEDURE — 80307 DRUG TEST PRSMV CHEM ANLYZR: CPT

## 2021-08-13 PROCEDURE — 87635 SARS-COV-2 COVID-19 AMP PRB: CPT

## 2021-08-13 PROCEDURE — 87186 SC STD MICRODIL/AGAR DIL: CPT

## 2021-08-13 PROCEDURE — 6360000002 HC RX W HCPCS: Performed by: ANESTHESIOLOGY

## 2021-08-13 PROCEDURE — 2500000003 HC RX 250 WO HCPCS: Performed by: STUDENT IN AN ORGANIZED HEALTH CARE EDUCATION/TRAINING PROGRAM

## 2021-08-13 PROCEDURE — 80053 COMPREHEN METABOLIC PANEL: CPT

## 2021-08-13 PROCEDURE — 6370000000 HC RX 637 (ALT 250 FOR IP): Performed by: SURGERY

## 2021-08-13 PROCEDURE — 84145 PROCALCITONIN (PCT): CPT

## 2021-08-13 PROCEDURE — 7100000001 HC PACU RECOVERY - ADDTL 15 MIN: Performed by: SURGERY

## 2021-08-13 PROCEDURE — 96361 HYDRATE IV INFUSION ADD-ON: CPT

## 2021-08-13 PROCEDURE — 99222 1ST HOSP IP/OBS MODERATE 55: CPT | Performed by: SURGERY

## 2021-08-13 PROCEDURE — 87185 SC STD ENZYME DETCJ PER NZM: CPT

## 2021-08-13 PROCEDURE — 6360000002 HC RX W HCPCS: Performed by: STUDENT IN AN ORGANIZED HEALTH CARE EDUCATION/TRAINING PROGRAM

## 2021-08-13 PROCEDURE — 2580000003 HC RX 258: Performed by: INTERNAL MEDICINE

## 2021-08-13 PROCEDURE — 2580000003 HC RX 258: Performed by: NURSE PRACTITIONER

## 2021-08-13 PROCEDURE — 96365 THER/PROPH/DIAG IV INF INIT: CPT

## 2021-08-13 PROCEDURE — 99254 IP/OBS CNSLTJ NEW/EST MOD 60: CPT | Performed by: INTERNAL MEDICINE

## 2021-08-13 PROCEDURE — 85025 COMPLETE CBC W/AUTO DIFF WBC: CPT

## 2021-08-13 PROCEDURE — 0K970ZZ DRAINAGE OF RIGHT UPPER ARM MUSCLE, OPEN APPROACH: ICD-10-PCS | Performed by: SURGERY

## 2021-08-13 PROCEDURE — 87070 CULTURE OTHR SPECIMN AEROBIC: CPT

## 2021-08-13 PROCEDURE — 83605 ASSAY OF LACTIC ACID: CPT

## 2021-08-13 RX ORDER — 0.9 % SODIUM CHLORIDE 0.9 %
1000 INTRAVENOUS SOLUTION INTRAVENOUS ONCE
Status: COMPLETED | OUTPATIENT
Start: 2021-08-13 | End: 2021-08-13

## 2021-08-13 RX ORDER — ACETAMINOPHEN 325 MG/1
650 TABLET ORAL EVERY 6 HOURS PRN
Status: DISCONTINUED | OUTPATIENT
Start: 2021-08-13 | End: 2021-08-14 | Stop reason: HOSPADM

## 2021-08-13 RX ORDER — ACETAMINOPHEN 500 MG
1000 TABLET ORAL ONCE
Status: COMPLETED | OUTPATIENT
Start: 2021-08-13 | End: 2021-08-13

## 2021-08-13 RX ORDER — SODIUM CHLORIDE 0.9 % (FLUSH) 0.9 %
10 SYRINGE (ML) INJECTION PRN
Status: DISCONTINUED | OUTPATIENT
Start: 2021-08-13 | End: 2021-08-14 | Stop reason: HOSPADM

## 2021-08-13 RX ORDER — MIDAZOLAM HYDROCHLORIDE 1 MG/ML
INJECTION INTRAMUSCULAR; INTRAVENOUS PRN
Status: DISCONTINUED | OUTPATIENT
Start: 2021-08-13 | End: 2021-08-13 | Stop reason: SDUPTHER

## 2021-08-13 RX ORDER — POTASSIUM CHLORIDE 20 MEQ/1
40 TABLET, EXTENDED RELEASE ORAL ONCE
Status: COMPLETED | OUTPATIENT
Start: 2021-08-13 | End: 2021-08-13

## 2021-08-13 RX ORDER — FENTANYL CITRATE 50 UG/ML
50 INJECTION, SOLUTION INTRAMUSCULAR; INTRAVENOUS EVERY 10 MIN PRN
Status: DISCONTINUED | OUTPATIENT
Start: 2021-08-13 | End: 2021-08-13 | Stop reason: HOSPADM

## 2021-08-13 RX ORDER — CLINDAMYCIN PHOSPHATE 600 MG/50ML
600 INJECTION INTRAVENOUS EVERY 8 HOURS
Status: DISCONTINUED | OUTPATIENT
Start: 2021-08-13 | End: 2021-08-13

## 2021-08-13 RX ORDER — MAGNESIUM HYDROXIDE 1200 MG/15ML
LIQUID ORAL CONTINUOUS PRN
Status: COMPLETED | OUTPATIENT
Start: 2021-08-13 | End: 2021-08-13

## 2021-08-13 RX ORDER — ACETAMINOPHEN 650 MG/1
650 SUPPOSITORY RECTAL EVERY 6 HOURS PRN
Status: DISCONTINUED | OUTPATIENT
Start: 2021-08-13 | End: 2021-08-14 | Stop reason: HOSPADM

## 2021-08-13 RX ORDER — LIDOCAINE HYDROCHLORIDE 10 MG/ML
1 INJECTION, SOLUTION EPIDURAL; INFILTRATION; INTRACAUDAL; PERINEURAL
Status: ACTIVE | OUTPATIENT
Start: 2021-08-13 | End: 2021-08-13

## 2021-08-13 RX ORDER — DIPHENHYDRAMINE HYDROCHLORIDE 50 MG/ML
12.5 INJECTION INTRAMUSCULAR; INTRAVENOUS
Status: DISCONTINUED | OUTPATIENT
Start: 2021-08-13 | End: 2021-08-13 | Stop reason: HOSPADM

## 2021-08-13 RX ORDER — SODIUM CHLORIDE 0.9 % (FLUSH) 0.9 %
5-40 SYRINGE (ML) INJECTION EVERY 12 HOURS SCHEDULED
Status: DISCONTINUED | OUTPATIENT
Start: 2021-08-13 | End: 2021-08-14 | Stop reason: HOSPADM

## 2021-08-13 RX ORDER — HYDROCODONE BITARTRATE AND ACETAMINOPHEN 5; 325 MG/1; MG/1
2 TABLET ORAL PRN
Status: DISCONTINUED | OUTPATIENT
Start: 2021-08-13 | End: 2021-08-13 | Stop reason: HOSPADM

## 2021-08-13 RX ORDER — HYDROCODONE BITARTRATE AND ACETAMINOPHEN 5; 325 MG/1; MG/1
1 TABLET ORAL EVERY 6 HOURS PRN
Status: DISCONTINUED | OUTPATIENT
Start: 2021-08-13 | End: 2021-08-14 | Stop reason: HOSPADM

## 2021-08-13 RX ORDER — ONDANSETRON 2 MG/ML
4 INJECTION INTRAMUSCULAR; INTRAVENOUS
Status: DISCONTINUED | OUTPATIENT
Start: 2021-08-13 | End: 2021-08-13 | Stop reason: HOSPADM

## 2021-08-13 RX ORDER — SODIUM CHLORIDE 9 MG/ML
INJECTION, SOLUTION INTRAVENOUS CONTINUOUS
Status: DISCONTINUED | OUTPATIENT
Start: 2021-08-13 | End: 2021-08-14 | Stop reason: HOSPADM

## 2021-08-13 RX ORDER — MORPHINE SULFATE 2 MG/ML
1 INJECTION, SOLUTION INTRAMUSCULAR; INTRAVENOUS EVERY 4 HOURS PRN
Status: DISCONTINUED | OUTPATIENT
Start: 2021-08-13 | End: 2021-08-14 | Stop reason: HOSPADM

## 2021-08-13 RX ORDER — MEPERIDINE HYDROCHLORIDE 25 MG/ML
12.5 INJECTION INTRAMUSCULAR; INTRAVENOUS; SUBCUTANEOUS EVERY 5 MIN PRN
Status: DISCONTINUED | OUTPATIENT
Start: 2021-08-13 | End: 2021-08-13 | Stop reason: HOSPADM

## 2021-08-13 RX ORDER — HYDROCODONE BITARTRATE AND ACETAMINOPHEN 5; 325 MG/1; MG/1
1 TABLET ORAL PRN
Status: DISCONTINUED | OUTPATIENT
Start: 2021-08-13 | End: 2021-08-13 | Stop reason: HOSPADM

## 2021-08-13 RX ORDER — SODIUM CHLORIDE, SODIUM LACTATE, POTASSIUM CHLORIDE, CALCIUM CHLORIDE 600; 310; 30; 20 MG/100ML; MG/100ML; MG/100ML; MG/100ML
INJECTION, SOLUTION INTRAVENOUS CONTINUOUS
Status: DISCONTINUED | OUTPATIENT
Start: 2021-08-13 | End: 2021-08-13

## 2021-08-13 RX ORDER — SODIUM CHLORIDE 0.9 % (FLUSH) 0.9 %
10 SYRINGE (ML) INJECTION EVERY 12 HOURS SCHEDULED
Status: DISCONTINUED | OUTPATIENT
Start: 2021-08-13 | End: 2021-08-14 | Stop reason: HOSPADM

## 2021-08-13 RX ORDER — METOCLOPRAMIDE HYDROCHLORIDE 5 MG/ML
10 INJECTION INTRAMUSCULAR; INTRAVENOUS
Status: DISCONTINUED | OUTPATIENT
Start: 2021-08-13 | End: 2021-08-13 | Stop reason: HOSPADM

## 2021-08-13 RX ORDER — CLINDAMYCIN PHOSPHATE 600 MG/50ML
600 INJECTION INTRAVENOUS ONCE
Status: COMPLETED | OUTPATIENT
Start: 2021-08-13 | End: 2021-08-13

## 2021-08-13 RX ORDER — PROPOFOL 10 MG/ML
INJECTION, EMULSION INTRAVENOUS PRN
Status: DISCONTINUED | OUTPATIENT
Start: 2021-08-13 | End: 2021-08-13 | Stop reason: SDUPTHER

## 2021-08-13 RX ORDER — SODIUM CHLORIDE 9 MG/ML
25 INJECTION, SOLUTION INTRAVENOUS PRN
Status: DISCONTINUED | OUTPATIENT
Start: 2021-08-13 | End: 2021-08-14 | Stop reason: HOSPADM

## 2021-08-13 RX ORDER — FENTANYL CITRATE 50 UG/ML
INJECTION, SOLUTION INTRAMUSCULAR; INTRAVENOUS PRN
Status: DISCONTINUED | OUTPATIENT
Start: 2021-08-13 | End: 2021-08-13 | Stop reason: SDUPTHER

## 2021-08-13 RX ORDER — SODIUM CHLORIDE 0.9 % (FLUSH) 0.9 %
5-40 SYRINGE (ML) INJECTION PRN
Status: DISCONTINUED | OUTPATIENT
Start: 2021-08-13 | End: 2021-08-14 | Stop reason: HOSPADM

## 2021-08-13 RX ORDER — TRAMADOL HYDROCHLORIDE 50 MG/1
50 TABLET ORAL EVERY 6 HOURS PRN
Status: DISCONTINUED | OUTPATIENT
Start: 2021-08-13 | End: 2021-08-14 | Stop reason: HOSPADM

## 2021-08-13 RX ORDER — ONDANSETRON 2 MG/ML
INJECTION INTRAMUSCULAR; INTRAVENOUS PRN
Status: DISCONTINUED | OUTPATIENT
Start: 2021-08-13 | End: 2021-08-13 | Stop reason: SDUPTHER

## 2021-08-13 RX ORDER — MORPHINE SULFATE 2 MG/ML
4 INJECTION, SOLUTION INTRAMUSCULAR; INTRAVENOUS
Status: DISCONTINUED | OUTPATIENT
Start: 2021-08-13 | End: 2021-08-13

## 2021-08-13 RX ORDER — ONDANSETRON 2 MG/ML
4 INJECTION INTRAMUSCULAR; INTRAVENOUS ONCE
Status: COMPLETED | OUTPATIENT
Start: 2021-08-13 | End: 2021-08-13

## 2021-08-13 RX ADMIN — MORPHINE SULFATE 1 MG: 2 INJECTION, SOLUTION INTRAMUSCULAR; INTRAVENOUS at 10:32

## 2021-08-13 RX ADMIN — SODIUM CHLORIDE 1000 ML: 9 INJECTION, SOLUTION INTRAVENOUS at 05:00

## 2021-08-13 RX ADMIN — MORPHINE SULFATE 4 MG: 2 INJECTION, SOLUTION INTRAMUSCULAR; INTRAVENOUS at 00:44

## 2021-08-13 RX ADMIN — PIPERACILLIN AND TAZOBACTAM 3375 MG: 3; .375 INJECTION, POWDER, LYOPHILIZED, FOR SOLUTION INTRAVENOUS at 18:09

## 2021-08-13 RX ADMIN — PROPOFOL 200 MG: 10 INJECTION, EMULSION INTRAVENOUS at 11:51

## 2021-08-13 RX ADMIN — ONDANSETRON 4 MG: 2 INJECTION INTRAMUSCULAR; INTRAVENOUS at 00:42

## 2021-08-13 RX ADMIN — ONDANSETRON 4 MG: 2 INJECTION INTRAMUSCULAR; INTRAVENOUS at 11:51

## 2021-08-13 RX ADMIN — PIPERACILLIN AND TAZOBACTAM 3375 MG: 3; .375 INJECTION, POWDER, LYOPHILIZED, FOR SOLUTION INTRAVENOUS at 22:52

## 2021-08-13 RX ADMIN — FENTANYL CITRATE 100 MCG: 50 INJECTION, SOLUTION INTRAMUSCULAR; INTRAVENOUS at 11:51

## 2021-08-13 RX ADMIN — SODIUM CHLORIDE 1000 ML: 9 INJECTION, SOLUTION INTRAVENOUS at 00:44

## 2021-08-13 RX ADMIN — CLINDAMYCIN PHOSPHATE 600 MG: 600 INJECTION, SOLUTION INTRAVENOUS at 00:44

## 2021-08-13 RX ADMIN — ACETAMINOPHEN 1000 MG: 500 TABLET ORAL at 00:41

## 2021-08-13 RX ADMIN — Medication 10 ML: at 10:33

## 2021-08-13 RX ADMIN — POTASSIUM CHLORIDE 40 MEQ: 20 TABLET, EXTENDED RELEASE ORAL at 06:21

## 2021-08-13 RX ADMIN — VANCOMYCIN HYDROCHLORIDE 750 MG: 750 INJECTION, POWDER, LYOPHILIZED, FOR SOLUTION INTRAVENOUS at 16:41

## 2021-08-13 RX ADMIN — SODIUM CHLORIDE: 9 INJECTION, SOLUTION INTRAVENOUS at 16:40

## 2021-08-13 RX ADMIN — HYDROCODONE BITARTRATE AND ACETAMINOPHEN 1 TABLET: 5; 325 TABLET ORAL at 18:20

## 2021-08-13 RX ADMIN — VANCOMYCIN HYDROCHLORIDE 750 MG: 750 INJECTION, POWDER, LYOPHILIZED, FOR SOLUTION INTRAVENOUS at 22:57

## 2021-08-13 RX ADMIN — VANCOMYCIN HYDROCHLORIDE 750 MG: 750 INJECTION, POWDER, LYOPHILIZED, FOR SOLUTION INTRAVENOUS at 10:28

## 2021-08-13 RX ADMIN — SODIUM CHLORIDE: 9 INJECTION, SOLUTION INTRAVENOUS at 10:27

## 2021-08-13 RX ADMIN — MIDAZOLAM HYDROCHLORIDE 2 MG: 2 INJECTION, SOLUTION INTRAMUSCULAR; INTRAVENOUS at 11:51

## 2021-08-13 RX ADMIN — TRAMADOL HYDROCHLORIDE 50 MG: 50 TABLET, FILM COATED ORAL at 06:21

## 2021-08-13 ASSESSMENT — PULMONARY FUNCTION TESTS
PIF_VALUE: 4
PIF_VALUE: 3
PIF_VALUE: 1
PIF_VALUE: 18
PIF_VALUE: 4
PIF_VALUE: 18
PIF_VALUE: 4
PIF_VALUE: 1
PIF_VALUE: 21
PIF_VALUE: 4
PIF_VALUE: 5
PIF_VALUE: 1
PIF_VALUE: 3
PIF_VALUE: 4
PIF_VALUE: 5
PIF_VALUE: 4
PIF_VALUE: 23

## 2021-08-13 ASSESSMENT — PAIN SCALES - GENERAL
PAINLEVEL_OUTOF10: 7
PAINLEVEL_OUTOF10: 7
PAINLEVEL_OUTOF10: 5
PAINLEVEL_OUTOF10: 0
PAINLEVEL_OUTOF10: 0
PAINLEVEL_OUTOF10: 7
PAINLEVEL_OUTOF10: 7
PAINLEVEL_OUTOF10: 4

## 2021-08-13 ASSESSMENT — ENCOUNTER SYMPTOMS
BACK PAIN: 0
WHEEZING: 0
BLOOD IN STOOL: 0
RHINORRHEA: 0
CONSTIPATION: 0
ANAL BLEEDING: 0
GASTROINTESTINAL NEGATIVE: 1
ABDOMINAL DISTENTION: 0
RESPIRATORY NEGATIVE: 1
ABDOMINAL PAIN: 0
PHOTOPHOBIA: 0
COLOR CHANGE: 1
DIARRHEA: 0
SHORTNESS OF BREATH: 0
EYES NEGATIVE: 1
SORE THROAT: 0
ALLERGIC/IMMUNOLOGIC NEGATIVE: 1
NAUSEA: 1
COUGH: 0
NAUSEA: 0
VOMITING: 0

## 2021-08-13 ASSESSMENT — PAIN DESCRIPTION - LOCATION
LOCATION: ARM
LOCATION: ARM

## 2021-08-13 ASSESSMENT — PAIN DESCRIPTION - PAIN TYPE
TYPE: ACUTE PAIN
TYPE: ACUTE PAIN

## 2021-08-13 ASSESSMENT — PAIN DESCRIPTION - ORIENTATION
ORIENTATION: RIGHT
ORIENTATION: RIGHT

## 2021-08-13 ASSESSMENT — PAIN - FUNCTIONAL ASSESSMENT: PAIN_FUNCTIONAL_ASSESSMENT: 0-10

## 2021-08-13 NOTE — ED NOTES
Lab called for add on Prolactoin, confirmed they have enough specimen.       Lalo Mane RN  08/13/21 2042

## 2021-08-13 NOTE — CONSULTS
Pt Name: Steph eMrrill  MRN: [id number]  YOB: 1990  Date of evaluation: 8/13/2021  Primary Care Physician: MARICRUZ Santos CNP  Patient evaluated at the request of  Dr. Nichols  Reason for evaluation: Right upper arm abscess    IMPRESSIONS:  1. Right upper arm abscess, unclear etiology    PLANS  1. Incision and drainage today    The options of therapy were discussed and the patient agrees to the above procedure. The procedure  as well as potential risks and complications including but not exclusive to infection, blood loss, damage to surrounding structures and even requiring further surgery  were discussed. All questions are answered . The patient appears to understands and is agreeable to the surgery. SUBJECTIVE:  History of Chief Complaint:    Alean Goodpasture is a 27 y. o.female who presents to the emergency room yesterday with a 2-day history of right arm swelling. 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 She denies any insect bite. Her pain is 10 out of 10 and radiates to axilla. It is difficult for her to move her arm. She states she has had a temperature of 102. She has had a previous breast abscess but no other abscesses. No history of diabetes  Past Medical History   has a past medical history of Anxiety, Chronic back pain, LGSIL on Pap smear of cervix, and Ovarian cyst.  Past Surgical History   has a past surgical history that includes laparoscopy (1-2015); Dilation and curettage of uterus (9-2014); and Breast surgery ().   Medications  Prior to Admission medications    Not on File    Scheduled Meds:   sodium chloride flush  5-40 mL Intravenous 2 times per day    enoxaparin  40 mg Subcutaneous Daily    vancomycin (VANCOCIN) intermittent dosing (placeholder)   Other RX Placeholder    vancomycin  750 mg Intravenous Q8H     Continuous Infusions:   sodium chloride  sodium chloride       PRN Meds:.sodium chloride flush, sodium chloride, acetaminophen **OR** acetaminophen, morphine, traMADol  Allergies  has No Known Allergies. Family History  family history includes Cancer in her paternal grandmother; High Blood Pressure in her father, maternal grandfather, and maternal grandmother; High Cholesterol in her maternal grandfather and maternal grandmother. Social History   reports that she has quit smoking. Her smoking use included cigarettes. She smoked 0.50 packs per day. She has never used smokeless tobacco. She reports previous alcohol use. She reports that she does not use drugs. Review of Systems:  14 systems were reviewed and negative other than [place]    OBJECTIVE  CURRENT VITALS:  height is 5' 2\" (1.575 m) and weight is 105 lb (47.6 kg). Her oral temperature is 100 °F (37.8 °C). Her blood pressure is 105/52 (abnormal) and her pulse is 93. Her respiration is 18 and oxygen saturation is 96%. Temperature Range (24h):Temp: 100 °F (37.8 °C) Temp  Av.6 °F (38.1 °C)  Min: 100 °F (37.8 °C)  Max: 101 °F (38.3 °C)  BP Range (65F): Systolic (27GJO), QWZ:854 , Min:96 , KOQ:515     Diastolic (49HDH), LBH:32, Min:52, Max:71    Pulse Range (24h): Pulse  Av.2  Min: 68  Max: 115  Respiration Range (24h): Resp  Av.7  Min: 16  Max: 18  Current Pulse Ox (24h):  SpO2: 96 %  Pulse Ox Range (24h):  SpO2  Av.3 %  Min: 96 %  Max: 100 %  Oxygen Amount and Delivery:    CONSTITUTIONAL: Alert and oriented times 3, no acute distress and cooperative to examination with proper mood and affect. SKIN: Skin color, texture, turgor normal. No rashes or lesions. , no bruising  LYMPH: no cervical nodes, no inguinal nodes  HEENT: Head is normocephalic, atraumatic.  PERRLA, Mucous membranes are moist.   NECK: Supple, symmetrical, trachea midline, no adenopathy, thyroid symmetric, not enlarged and no tenderness, skin normal.  CHEST/LUNGS: chest symmetric with normal A/P diameter, normal respiratory rate and rhythm, lungs clear to auscultation without wheezes, rales or rhonchi. No accessory muscle use. CARDIOVASCULAR: Heart sounds are normal.  Regular rate and rhythm without murmur, gallop or rub. Normal S1 and S2. Carotid and femoral pulses 2+/4 and equal bilaterally. ABDOMEN: Normal shape. .  Normal bowel sounds. No bruits. Soft, nondistended, no masses or organomegaly. no evidence of hernia. Tenderness: absent. RECTAL: deferred, not clinically indicated  NEUROLOGIC: There are no focalizing motor or sensory deficits. CN II-XII are grossly intact. Benedetta Ou EXTREMITIES: no cyanosis, no clubbing and no edema. 4 cm fluctuant tender erythematous area of her right mid biceps region. No drainage  PYSCH:  Mood, affect and judgement are normal, alert and oriented x 3  LABS:  Recent Labs     08/12/21  2130 08/13/21  0505   WBC 12.9* 14.2*   HGB 12.0 9.9*   HCT 36.1* 29.6*    225   * 136   K 4.1 3.2*   CL 96 102   CO2 26 23   BUN 7 6   CREATININE 0.65 0.48*   MG  --  1.8   CALCIUM 8.9 7.7*   AST 13 10   ALT 9 8   BILITOT 0.6 0.4   LACTA 2.3*  --        RADIOLOGY:  I have personally reviewed the following films:      Thank you for the interesting evaluation. Further recommendations to follow.     Electronically signed by Rona Funez MD on 8/13/21 at 7:38 AM EDT

## 2021-08-13 NOTE — ANESTHESIA POSTPROCEDURE EVALUATION
Department of Anesthesiology  Postprocedure Note    Patient: Ced Fernandez  MRN: [id number]  YOB: 1990  Date of evaluation: 8/13/2021  Time:  12:21 PM     Procedure Summary     Date: 08/13/21 Room / Location: [facility] OR [facility]    Anesthesia Start: 1201 Anesthesia Stop:     Procedure: ARM INCISION AND DRAINAGE, RIGHT (Right Arm Upper) Diagnosis: (Right arm abscess)    Surgeons: Richie Calloway MD Responsible Provider: Bernadette Leonard MD    Anesthesia Type: general ASA Status: 3 - Emergent          Anesthesia Type: No value filed. Connor Phase I: Connor Score: 10    Connor Phase II:      Last vitals: Reviewed and per EMR flowsheets.        Anesthesia Post Evaluation    Patient location during evaluation: PACU  Patient participation: complete - patient participated  Level of consciousness: awake  Pain score: 0  Airway patency: patent  Nausea & Vomiting: no nausea  Complications: no  Cardiovascular status: hemodynamically stable  Respiratory status: face mask  Hydration status: euvolemic

## 2021-08-13 NOTE — ANESTHESIA PRE PROCEDURE
Department of Anesthesiology  Preprocedure Note       Name:  Kirill Batch   Age:  27 y.o.  :  1990                                          MRN:  [id number]         Date:  2021      Surgeon: Jonna Pressley):  Phuong Tovar MD    Procedure: Procedure(s):  ARM INCISION AND DRAINAGE, RIGHT    Medications prior to admission:   Prior to Admission medications    Not on File       Current medications:    Current Facility-Administered Medications   Medication Dose Route Frequency Provider Last Rate Last Admin    sodium chloride flush 0.9 % injection 5-40 mL  5-40 mL Intravenous 2 times per day Nicoletto Bolzan-Roche, APRN - CNP        sodium chloride flush 0.9 % injection 5-40 mL  5-40 mL Intravenous PRN Nicoletto Bolzan-Roche, APRN - CNP        0.9 % sodium chloride infusion  25 mL Intravenous PRN Nicoletto Bolzan-Roche, APRN - CNP        enoxaparin (LOVENOX) injection 40 mg  40 mg Subcutaneous Daily Nicoletto Bolzan-Roche, APRN - CNP        acetaminophen (TYLENOL) tablet 650 mg  650 mg Oral Q6H PRN Nicoletto Bolzan-Roche, APRN - CNP        Or    acetaminophen (TYLENOL) suppository 650 mg  650 mg Rectal Q6H PRN Nicoletto Bolzan-Roche, APRN - CNP        0.9 % sodium chloride infusion   Intravenous Continuous Nicoletto Bolzan-Roche, APRN - CNP        morphine (PF) injection 1 mg  1 mg Intravenous Q4H PRN Adrian Garcia MD        traMADol Elfida Reining) tablet 50 mg  50 mg Oral Q6H PRN Adrian Garcia MD   50 mg at 21 3936    vancomycin (VANCOCIN) intermittent dosing (placeholder)   Other RX Placeholder Nicoletto Bolzan-Roche, APRN - CNP        vancomycin (VANCOCIN) 750 mg in dextrose 5 % 250 mL IVPB  750 mg Intravenous Q8H Nicoletto Bolzan-Roche, APRN - CNP        meperidine (DEMEROL) injection 12.5 mg  12.5 mg Intravenous Q5 Min PRN Willy Rodriguez MD        fentaNYL (SUBLIMAZE) injection 50 mcg  50 mcg Intravenous Q10 Min PRN Willy Rodriguez MD        HYDROmorphone (DILAUDID) injection 0.5 mg  0.5 mg Intravenous Q10 Min PRN Toshia Stanley MD        HYDROcodone-acetaminophen [facility]) 5-325 MG per tablet 1 tablet  1 tablet Oral PRN Toshia Stanley MD        Or    HYDROcodone-acetaminophen [facility]) 5-325 MG per tablet 2 tablet  2 tablet Oral PRN Toshia Stanley MD        ondansetron [facility]) injection 4 mg  4 mg Intravenous Once PRN Toshia Stanley MD        metoclopramide [facility]) injection 10 mg  10 mg Intravenous Once PRN Toshia Stanley MD        diphenhydrAMINE (BENADRYL) injection 12.5 mg  12.5 mg Intravenous Once PRN Toshia Stanley MD           Allergies:  No Known Allergies    Problem List:    Patient Active Problem List   Diagnosis Code    Anxiety F41.9    Chronic back pain M54.9, G89.29    LGSIL on Pap smear of cervix R87.612    Cervical high risk HPV (human papillomavirus) test positive R87.810    Abscess of arm L02.419    Sepsis ([facility] [address] A41.9       Past Medical History:        Diagnosis Date    Anxiety     Chronic back pain     LGSIL on Pap smear of cervix 12/4/2020    Ovarian cyst        Past Surgical History:        Procedure Laterality Date    BREAST SURGERY      mastitis    DILATION AND CURETTAGE OF UTERUS  9-2014    LAPAROSCOPY  1-2015       Social History:    Social History     Tobacco Use    Smoking status: Former Smoker     Packs/day: 0.50     Types: Cigarettes    Smokeless tobacco: Never Used   Substance Use Topics    Alcohol use: Not Currently     Alcohol/week: 0.0 standard drinks                                Counseling given: Not Answered      Vital Signs (Current):   Vitals:    08/13/21 0444 08/13/21 0447 08/13/21 0545 08/13/21 0729   BP: (!) 105/52 (!) 105/52  (!) 101/49   Pulse: 93 93  93   Resp:  18  18   Temp:    99.5 °F (37.5 °C)   TempSrc:    Oral   SpO2:    99%   Weight:   105 lb (47.6 kg)    Height:                                                  BP Readings from Last 3 Encounters:   08/13/21 (!) 101/49   12/04/20 112/72   11/29/20 120/72       NPO Status:                                                                                 BMI:   Wt Readings from Last 3 Encounters:   08/13/21 105 lb (47.6 kg)   12/04/20 108 lb (49 kg)   11/29/20 124 lb (56.2 kg)     Body mass index is 19.2 kg/m². CBC:   Lab Results   Component Value Date    WBC 14.2 08/13/2021    RBC 3.79 08/13/2021    RBC 4.32 01/23/2012    HGB 9.9 08/13/2021    HCT 29.6 08/13/2021    MCV 78.0 08/13/2021    RDW 15.0 08/13/2021     08/13/2021       CMP:   Lab Results   Component Value Date     08/13/2021    K 3.2 08/13/2021     08/13/2021    CO2 23 08/13/2021    BUN 6 08/13/2021    CREATININE 0.48 08/13/2021    GFRAA >60.0 08/13/2021    LABGLOM >60.0 08/13/2021    GLUCOSE 122 08/13/2021    GLUCOSE 79 01/23/2012    PROT 5.9 08/13/2021    CALCIUM 7.7 08/13/2021    BILITOT 0.4 08/13/2021    ALKPHOS 110 08/13/2021    AST 10 08/13/2021    ALT 8 08/13/2021       POC Tests: No results for input(s): POCGLU, POCNA, POCK, POCCL, POCBUN, POCHEMO, POCHCT in the last 72 hours.     Coags: No results found for: PROTIME, INR, APTT    HCG (If Applicable):   Lab Results   Component Value Date    PREGTESTUR Negative 07/29/2015        ABGs: No results found for: PHART, PO2ART, UXX1NVE, LZK2OEP, BEART, R8TESAQK     Type & Screen (If Applicable):  No results found for: LABABO, LABRH    Drug/Infectious Status (If Applicable):  No results found for: HIV, HEPCAB    COVID-19 Screening (If Applicable): No results found for: COVID19        Anesthesia Evaluation  Patient summary reviewed and Nursing notes reviewed no history of anesthetic complications:   Airway: Mallampati: II  TM distance: >3 FB   Neck ROM: full  Mouth opening: > = 3 FB Dental: normal exam         Pulmonary:Negative Pulmonary ROS and normal exam                               Cardiovascular:Negative CV ROS                      Neuro/Psych:   Negative Neuro/Psych ROS GI/Hepatic/Renal: Neg GI/Hepatic/Renal ROS            Endo/Other: Negative Endo/Other ROS                    Abdominal:             Vascular: negative vascular ROS. Other Findings:             Anesthesia Plan      general     ASA 3 - emergent       Induction: intravenous. MIPS: Prophylactic antiemetics administered. Anesthetic plan and risks discussed with patient.                       Willy Rodriguez MD   8/13/2021

## 2021-08-13 NOTE — PROGRESS NOTES
Department of Internal Medicine  General Internal Medicine  Attending Progress Note      SUBJECTIVE:  Pt seen and examined. Surgical dressing over RUE as she just arrived from I&D. Feels good. No complaints    OBJECTIVE      Medications    Current Facility-Administered Medications: sodium chloride flush 0.9 % injection 5-40 mL, 5-40 mL, Intravenous, 2 times per day  sodium chloride flush 0.9 % injection 5-40 mL, 5-40 mL, Intravenous, PRN  0.9 % sodium chloride infusion, 25 mL, Intravenous, PRN  enoxaparin (LOVENOX) injection 40 mg, 40 mg, Subcutaneous, Daily  acetaminophen (TYLENOL) tablet 650 mg, 650 mg, Oral, Q6H PRN **OR** acetaminophen (TYLENOL) suppository 650 mg, 650 mg, Rectal, Q6H PRN  0.9 % sodium chloride infusion, , Intravenous, Continuous  morphine (PF) injection 1 mg, 1 mg, Intravenous, Q4H PRN  traMADol (ULTRAM) tablet 50 mg, 50 mg, Oral, Q6H PRN  vancomycin (VANCOCIN) intermittent dosing (placeholder), , Other, RX Placeholder  vancomycin (VANCOCIN) 750 mg in dextrose 5 % 250 mL IVPB, 750 mg, Intravenous, Q8H  sodium chloride flush 0.9 % injection 10 mL, 10 mL, Intravenous, 2 times per day  sodium chloride flush 0.9 % injection 10 mL, 10 mL, Intravenous, PRN  0.9 % sodium chloride infusion, 25 mL, Intravenous, PRN  lidocaine PF 1 % injection 1 mL, 1 mL, Intradermal, Once PRN  HYDROcodone-acetaminophen (NORCO) 5-325 MG per tablet 1 tablet, 1 tablet, Oral, Q6H PRN  piperacillin-tazobactam (ZOSYN) 3,375 mg in dextrose 5 % 50 mL IVPB extended infusion (mini-bag), 3,375 mg, Intravenous, Q8H  Physical    VITALS:  /72   Pulse 90   Temp 98.2 °F (36.8 °C)   Resp 21   Ht 5' 2\" (1.575 m)   Wt 105 lb (47.6 kg)   SpO2 98%   BMI 19.20 kg/m²   Constitutional: Awake and alert in no acute distress.  Lying in bed comfortably  Head: Normocephalic, atraumatic  Eyes: EOMI, PERRLA  ENT: moist mucous membranes  Neck: neck supple, trachea midline  Lungs: Good inspiratory effort, CTABL, no wheeze, no rhonchi, no rales  Heart: RRR, normal S1 and S2, no murmurs  GI: Soft, non-distended, non tender, no guarding, no rebound, +BS  MSK: RUE with surgical dressing and guaze in place c/d/i, no edema noted  Pulses: 2+ pulses bilaterally  Skin: warm, dry  Psych: appropriate affect     Data    CBC:   Lab Results   Component Value Date    WBC 14.2 08/13/2021    RBC 3.79 08/13/2021    RBC 4.32 01/23/2012    HGB 9.9 08/13/2021    HCT 29.6 08/13/2021    MCV 78.0 08/13/2021    MCH 26.2 08/13/2021    MCHC 33.6 08/13/2021    RDW 15.0 08/13/2021     08/13/2021    MPV 8.6 07/29/2015     CMP:    Lab Results   Component Value Date     08/13/2021    K 3.2 08/13/2021     08/13/2021    CO2 23 08/13/2021    BUN 6 08/13/2021    CREATININE 0.48 08/13/2021    GFRAA >60.0 08/13/2021    LABGLOM >60.0 08/13/2021    GLUCOSE 122 08/13/2021    GLUCOSE 79 01/23/2012    PROT 5.9 08/13/2021    LABALBU 2.9 08/13/2021    LABALBU 4.9 01/23/2012    CALCIUM 7.7 08/13/2021    BILITOT 0.4 08/13/2021    ALKPHOS 110 08/13/2021    AST 10 08/13/2021    ALT 8 08/13/2021       ASSESSMENT AND PLAN      # Sepsis 2/2 RUE abscess/cellulitis  - febrile, leukocytosis, elevated lactate  - sp I&D today by surgery - very extensive and severe abscess  - started on vanco. Added zosyn today per ID recs  - monitor cultures  - pain control    Disposition: Vanc/zosyn and awaiting cultures.       [facility] DO  Internal Medicine

## 2021-08-13 NOTE — BRIEF OP NOTE
Brief Postoperative Note      Patient: Charma Hodgkins  YOB: 1990  MRN: [id number]    Date of Procedure: 8/13/2021    Pre-Op Diagnosis: Right arm abscess    Post-Op Diagnosis: Same       Procedure(s):  ARM INCISION AND DRAINAGE, RIGHT    Surgeon(s):  Kisha Bal MD    Assistant:  First Assistant: Marce Da Silva    Anesthesia: General    Estimated Blood Loss (mL): Minimal    Complications: None    Specimens:   ID Type Source Tests Collected by Time Destination   1 : right upper arm culture Swab Arm CULTURE, SURGICAL Kisha Bal MD 8/13/2021 1215        Implants:  * No implants in log *      Drains: * No LDAs found *    Findings: Large subcutaneous and intramuscular abscess of the right biceps area    Electronically signed by Kisha Bal MD on 8/13/2021 at 12:21 PM

## 2021-08-13 NOTE — PROGRESS NOTES
Received pt from ED. Patient alert & oriented x4.pt has low grade fever 37.8 other Vital signs stable. Patient denies shortness of breath or chest pain. pt endorses pain in RUE. NP Monty Solis at bedside to assess. IV fluid bolus initiated. Safety maintained with call bell in reach. Will continue to Monitor.

## 2021-08-13 NOTE — ED NOTES
Pt in bed with eyes closed. Respirations are even and unlabored. No s/s of distress noted at this time. Will continue to monitor.         Lupe Castro RN  08/13/21 2163

## 2021-08-13 NOTE — CARE COORDINATION
TEAM ROUNDS COMPLETED. PT CURRENTLY AT INTERVENTION. ID CONSULTED. PT WILL LIKELY NEED LTAB, POSSIBLY A PICC. IV CHECK SENT. WILL MEET WITH PATIENT WHEN ARRIVES BACK TO UNIT.

## 2021-08-13 NOTE — CONSULTS
Infectious Disease     Patient Name: Zachary Worthington  Date: 8/13/2021  YOB: 1990  Medical Record Number: [id number]      Right upper extremity abscess deep to muscle        History of Present Illness:  Anxiety ovarian cyst chronic back pain  Breast abscess that required incision and drainage    Patient presents with right upper extremity swelling pain area of her right biopsy sepsis 2 days prior to admission fevers 102 on admission to the hospital    Denies IV drug use  The patient has had admission with overdose in the past    08/13/2021 taken to surgery  POSTOPERATIVE DIAGNOSIS:  Right arm intramuscular abscess. PROCEDURE:  Incision and drainage of abscess. EXAMINATION: XR HUMERUS RIGHT (MIN 2 VIEWS), 8/13/2021 12:56 AM        CLINICAL HISTORY:  abscess, r/o subcutaneous air        COMPARISON: None       TECHNIQUE: 3 views       RIGHT HUMERUS FINDINGS:     There are no lytic or sclerotic bone lesions.       There is no acute  fracture or subluxation. The joint spaces are preserved.        The soft tissues are within normal limits.  There are no radiopaque foreign bodies in the soft tissues.          Impression   There are no acute osseous injuries. Review of Systems   Constitutional: Positive for chills, diaphoresis and fever. HENT: Negative. Eyes: Negative. Respiratory: Negative. Cardiovascular: Negative. Gastrointestinal: Negative. Endocrine: Negative. Genitourinary: Negative. Skin: Positive for color change and wound. Neurological: Negative.         Review of Systems: All 14 review of systems negative other than as stated above    Social History     Tobacco Use    Smoking status: Former Smoker     Packs/day: 0.50     Types: Cigarettes    Smokeless tobacco: Never Used   Substance Use Topics    Alcohol use: Not Currently     Alcohol/week: 0.0 standard drinks    Drug use: Never     Types: Opiates      Comment: heroin         Past Medical History: Diagnosis Date    Anxiety     Chronic back pain     LGSIL on Pap smear of cervix 12/4/2020    Ovarian cyst            Past Surgical History:   Procedure Laterality Date    BREAST SURGERY      mastitis    DILATION AND CURETTAGE OF UTERUS  9-2014    LAPAROSCOPY  1-2015         No current facility-administered medications on file prior to encounter. No current outpatient medications on file prior to encounter. No Known Allergies      Family History   Problem Relation Age of Onset    Cancer Paternal Grandmother         lung    High Blood Pressure Father     High Blood Pressure Maternal Grandmother     High Cholesterol Maternal Grandmother     High Blood Pressure Maternal Grandfather     High Cholesterol Maternal Grandfather          Physical Exam:      Physical Exam  Constitutional:       General: She is not in acute distress. Appearance: Normal appearance. She is not ill-appearing. HENT:      Head: Normocephalic and atraumatic. Eyes:      Pupils: Pupils are equal, round, and reactive to light. Neck:      Vascular: No carotid bruit. Cardiovascular:      Heart sounds: Normal heart sounds. No murmur heard. Pulmonary:      Effort: Pulmonary effort is normal. No respiratory distress. Breath sounds: No wheezing, rhonchi or rales. Abdominal:      General: Abdomen is flat. There is no distension. Palpations: There is no mass. Tenderness: There is no abdominal tenderness. There is no right CVA tenderness, left CVA tenderness, guarding or rebound. Hernia: No hernia is present. Musculoskeletal:         General: Swelling and tenderness present. Arms:       Cervical back: Normal range of motion and neck supple. No rigidity or tenderness. Lymphadenopathy:      Cervical: No cervical adenopathy. Skin:     Findings: Erythema present. Neurological:      General: No focal deficit present. Mental Status: She is alert.          Blood pressure 111/72, pulse 90, temperature 98.2 °F (36.8 °C), resp. rate 21, height 5' 2\" (1.575 m), weight 105 lb (47.6 kg), SpO2 98 %, not currently breastfeeding.       .   Lab Results   Component Value Date    WBC 14.2 (H) 08/13/2021    HGB 9.9 (L) 08/13/2021    HCT 29.6 (L) 08/13/2021    MCV 78.0 (L) 08/13/2021     08/13/2021     Lab Results   Component Value Date     08/13/2021    K 3.2 08/13/2021     08/13/2021    CO2 23 08/13/2021    BUN 6 08/13/2021    CREATININE 0.48 08/13/2021    GLUCOSE 122 08/13/2021    GLUCOSE 79 01/23/2012    CALCIUM 7.7 08/13/2021                ASSESSMENT:  Patient Active Problem List   Diagnosis    Anxiety    Chronic back pain    LGSIL on Pap smear of cervix    Cervical high risk HPV (human papillomavirus) test positive    Abscess of arm    Sepsis ([facility] [address]         PLAN:    Right upper extremity abscess deep to muscle    Cultures pending    Continue vancomycin add Zosyn

## 2021-08-13 NOTE — CARE COORDINATION
IV BENEFIT REQUEST FORM    FAX FROM: [id number] [facility]                        [address] [facility]    REQUESTED BY: Electronically signed by Aneesh Irizarry RN on 2021 at 1:18 PM                                               RN/C3: PHONE: [phone])     DATE:/TIME OF REQUEST: 21  TIME: 1:18 PM      TO: Gary Gonzalez [address] TO: [phone]    PHONE: [phone]     THIS PATIENT HAS BEEN IDENTIFIED TO POSSIBLY NEED LONG TERM IV'S. PLEASE CHECK INSURANCE COVERAGE FOR THE FOLLOWING PT/DRUGS. PATIENT'S NAME: Mercy Collins                              [facility]   PATIENT'S : 1990  PATIENT ADDRESS: [facility] [address] [place] [address]  SSN:    ([id number])     PAYOR NAME:  Payor: [address] [place] [address]  [address] / Plan: [address] [place] [address]  [address] / Product Type: *No Product type* /     DRUG: (VANCOMYCIN)                         DOSE: (750MG)            FREQUENCY: Q (8) HR      __________ CHECK HERE IF PT HAS NO INSURANCE AND REQUESTING SELF PAY COST. *IF [facility] IS NOT A PROVIDER FOR THIS PATIENT, PLEASE FORWARD INFO VIA FAX TO [facility] @ [phone],(PHONE NUMBER: [phone]) TO RUN BENEFIT VERIFICATION AND NOTIFY THE ABOVE C3 OF THIS PLAN. (FAX FACE SHEET WITH DEMOGRAPHICS AND INSURANCE INFO WITH THIS FORM.)  PLEASE FAX BENEFIT INFO TO: THE [phone]    This message is intended only for the use of the individual or entity to which it is addressed and may contain information that is privileged, confidential, and exempt from disclosure under applicable law. If the reader of the notice is not intended recipient of the employee/agent responsible for delivering the message to the intended recipient, you are hereby notified than any dissemination, distribution or copying of this communication is strictly prohibited.  Please contact the sender for further instructions on handling the information.

## 2021-08-13 NOTE — FLOWSHEET NOTE
[phone]- Dr. Amy Blankenship made aware of pt tox screen being positive for amphetamines, also aware of patient Potassium being 3.2, no new orders received-[facility]  Electronically signed by Trisha Villa RN on 8/13/2021

## 2021-08-13 NOTE — ED NOTES
Report called to [id number], report given to [facility]. Transport notified.       Dionne Bai RN  08/13/21 3949

## 2021-08-13 NOTE — OP NOTE
[address]                      [facility], [address] [facility]                                OPERATIVE REPORT    PATIENT NAME: Lila Mcmullen                    :        1990  MED REC NO:   [id number]                            ROOM:       W480  ACCOUNT NO:   [de-identified]                           ADMIT DATE: 2021  PROVIDER:     Veda Tavarez MD    DATE OF PROCEDURE:  2021    LOCATION:  [facility]. PREOPERATIVE DIAGNOSIS:  Right arm abscess. POSTOPERATIVE DIAGNOSIS:  Right arm intramuscular abscess. PROCEDURE:  Incision and drainage of abscess. SURGEON:  Veda Tavarez MD    ANESTHESIA:  General.    COMPLICATIONS:  None. ESTIMATED BLOOD LOSS:  Minimal.    HISTORY:  The patient is a 69-year-old female who comes in with a right  upper arm abscess, which she claims is not due to any injection of  medication. She has never had an abscess there before. She claims she  caught the abscess from a neighbor. I discussed with her her options of  therapy and she is agreeable to incision and drainage. The procedure as  well as the risks and complications were discussed including, but not  exclusive to infection, blood loss, damage to surrounding structures,  and even the possibility of needing further surgery in the future were  all discussed. She understood and was agreeable to the procedure. OPERATIVE PROCEDURE:  The patient brought in the operative suite, placed  in the supine position where anesthesia was induced and she was  intubated. The right arm was placed out on armboard and it was prepped  and draped in a sterile fashion. Time-out called. The patient and  procedure were properly identified. There was a 4 cm fluctuant area  present on the right mid biceps region. This area was incised with a  cruciate incision and a large amount of purulent material came out under  pressure. It was a brownish colored fluid.   It was cultured and the  wound was then irrigated with saline solution and the abscess appeared  to go intramuscularly in the upper arm region. There was minimal blood  loss during the procedure, there was some oozing of the skin. After  irrigating with saline solution, it was packed with quarter-inch plain  Nu Gauze and a dry sterile dressing was placed over. She tolerated the  procedure well and I spoke with her mother after the procedure by phone.         Shelley Brady MD    D: 08/13/2021 12:31:11       T: 08/13/2021 12:34:09     [id number]  Job#: [id number]     Doc#: [id number]    CC:

## 2021-08-13 NOTE — PROGRESS NOTES
Received from or into pacu on a cart accompanied in by Dr Rosie Esquivel. Oral airway in place, O2 on at [facility] Breath sounds clear to anterior auscultation. SAO2 100%. MP RSR. Right arm dressing clean and dry, right fingers warm with brisk capillary refill noted. /right radial pulse palpable. Right arm elevated onto pillow.

## 2021-08-13 NOTE — ED NOTES
Pt in bed with eyes closed. Respirations are even and unlabored. No s/s of distress noted at this time. Will continue to monitor.         Jose Martinez RN  08/13/21 8510

## 2021-08-13 NOTE — PROGRESS NOTES
Pharmacy Note  Vancomycin Consult    Irma Bentley is a 27 y.o. female started on Vancomycin for SSTI; consult received from Gloria Parish NP to manage therapy. Also receiving the following antibiotics: clindamycin. SIRS (systemic inflammatory response syndrome) (HCC) [R65.10]  Abscess of arm [L02.419]  Cellulitis and abscess of upper extremity [L03.119, L02.419]  Allergies: Patient has no known allergies. Temp max: 101F    Cultures  No results for input(s): BC, BLOODCULT2, MRSAC, RESPCULTURE, LABGRAM, ORG, CXSURG, WNDABS, LABANAE, LABURIN, SPNEUAGU, HSVSRC, FLUA, FLUB, AFBSMEAR, CXST, FUNGUSSMEAR, LABLEGI, VRECX, CDIFPCR, [id number], GIAAGS, ROTA, FECLEU, COVID19 in the last 720 hours. Height: 5' 2\" (157.5 cm), Weight: 105 lb (47.6 kg), Body mass index is 19.2 kg/m². MRSA Nasal swab:N/a, does not meet criteria    Recent Labs     08/13/21  0505   CREATININE 0.48*   Estimated Creatinine Clearance: 129 mL/min (A) (based on SCr of 0.48 mg/dL (L)). .    Goal Trough Level: 10-15 mcg/mL, AUC < 500    Assessment/Plan:  Will initiate Vancomycin 750 mg IV every 8 hours. Anticipate random level 8/14 AM. Bayesian model predicts , trough 13.8, Pauc 67%, Pconc 23%, tox 9%. Timing of future trough levels may be adjusted based on culture results, renal function, and clinical response. Thank you for the consult. Will continue to follow. BARNEY Rubio Ph.  8/13/2021  6:02 AM

## 2021-08-13 NOTE — CARE COORDINATION
[facility] Case Management Initial Discharge Assessment    Met with Patient to discuss discharge plan. PCP: Davian Villafuerte, [facility]                                Date of Last Visit: June. 2021    If no PCP, list provided? Declined    Discharge Planning    Living Arrangements: independently at home    Who do you live with? Mother , 2 kids    Who helps you with your care:  self    If lives at home:     Do you have any barriers navigating in your home? no    Patient can perform ADL? Yes    Current Services (outpatient and in home) :  None    Dialysis: No    Is transportation available to get to your appointments? Yes    DME Equipment:  no    Respiratory equipment: None    Respiratory provider:  no     Pharmacy:  [facility] [address] [facility] with Medication Assistance Program?  No      Patient agreeable to [place] [address]? No    Patient agreeable to SNF/Rehab? No    Other discharge needs identified? N/A    Does Patient Have a High-Risk for Readmission Diagnosis (CHF, PN, MI, COPD)? No         Initial Discharge Plan? (Note: please see concurrent daily documentation for any updates after initial note).     Patient plans to return home     Readmission Risk              Risk of Unplanned Readmission:  8       DCCOP Completed  Electronically signed by KHRIS Bautista, NATIVIDAD on 8/13/2021 at 3:39 PM

## 2021-08-13 NOTE — ED PROVIDER NOTES
[address] [facility] ED  EMERGENCY DEPARTMENT ENCOUNTER      Pt Name: Josie Yo  MRN: [id number]  Олегgflata 1990  Date of evaluation: 8/12/2021  Provider: Bryan Reid Dr       Chief Complaint   Patient presents with    Abscess     to right inner bicep          HISTORY OF PRESENT ILLNESS   (Location/Symptom, Timing/Onset, Context/Setting, Quality, Duration, Modifying Factors, Severity)  Note limiting factors. Josie Yo is a 27 y.o. female who per chart review has pmhx of anxiety presents to the emergency department for evaluation of R arm swelling, erythema, pain x 2 days. Pt states she thinks she has an abscess. 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 She does have a history of abscesses as well. Redness has progressed significantly tonight. Pain 10/10. Pain radiates to R axilla Unable to move arm due to pain. + for nausea, fatigue, fever to Tmax of 102. Denies IV drug use. Took advil prior to arrival. Denies chills, vomiting, diarrhea, abd pain, chest pain, sob, cough, numbness, tingling, weakness. HPI    Nursing Notes were reviewed. REVIEW OF SYSTEMS    (2-9 systems for level 4, 10 or more for level 5)     Review of Systems   Constitutional: Positive for fatigue and fever. Negative for chills. HENT: Negative for congestion. Eyes: Negative for photophobia. Respiratory: Negative for cough, shortness of breath and wheezing. Cardiovascular: Negative for chest pain and palpitations. Gastrointestinal: Positive for nausea. Negative for abdominal distention, abdominal pain, anal bleeding, blood in stool, constipation, diarrhea and vomiting. Genitourinary: Negative for dysuria, frequency and hematuria. Musculoskeletal: Negative for myalgias. +R arm swelling, erythema, pain   Allergic/Immunologic: Negative for immunocompromised state. Neurological: Negative for dizziness, weakness and headaches.    All other systems reviewed and are negative. Except as noted above the remainder of the review of systems was reviewed and negative. PAST MEDICAL HISTORY     Past Medical History:   Diagnosis Date    Anxiety     Chronic back pain     LGSIL on Pap smear of cervix 12/4/2020    Ovarian cyst          SURGICAL HISTORY       Past Surgical History:   Procedure Laterality Date    BREAST SURGERY      mastitis    DILATION AND CURETTAGE OF UTERUS  9-2014    LAPAROSCOPY  1-2015         CURRENT MEDICATIONS       Previous Medications    No medications on file       ALLERGIES     Patient has no known allergies. FAMILY HISTORY       Family History   Problem Relation Age of Onset    Cancer Paternal Grandmother         lung    High Blood Pressure Father     High Blood Pressure Maternal Grandmother     High Cholesterol Maternal Grandmother     High Blood Pressure Maternal Grandfather     High Cholesterol Maternal Grandfather           SOCIAL HISTORY       Social History     Socioeconomic History    Marital status: Single     Spouse name: None    Number of children: None    Years of education: None    Highest education level: None   Occupational History    None   Tobacco Use    Smoking status: Former Smoker     Packs/day: 0.50     Types: Cigarettes    Smokeless tobacco: Never Used   Substance and Sexual Activity    Alcohol use: Not Currently     Alcohol/week: 0.0 standard drinks    Drug use: Never     Types: Opiates      Comment: heroin    Sexual activity: Yes     Partners: Female, Male     Birth control/protection: I.U.D. Other Topics Concern    None   Social History Narrative    None     Social Determinants of Health     Financial Resource Strain:     Difficulty of Paying Living Expenses:    Food Insecurity:     Worried About Running Out of Food in the Last Year:     [address] in the Last Year:    Transportation Needs:     Lack of Transportation (Medical):      Lack of Transportation (Non-Medical):    Physical Activity:     Days of Exercise per Week:     Minutes of Exercise per Session:    Stress:     Feeling of Stress :    Social Connections:     Frequency of Communication with Friends and Family:     Frequency of Social Gatherings with Friends and Family:     Attends Jain Services:     Active Member of Clubs or Organizations:     Attends Club or Organization Meetings:     Marital Status:    Intimate Partner Violence:     Fear of Current or Ex-Partner:     Emotionally Abused:     Physically Abused:     Sexually Abused:        SCREENINGS        Yeni Coma Scale  Eye Opening: Spontaneous  Best Verbal Response: Oriented  Best Motor Response: Obeys commands  Yeni Coma Scale Score: 15               PHYSICAL EXAM    (up to 7 for level 4, 8 or more for level 5)     ED Triage Vitals [08/12/21 2125]   BP Temp Temp Source Pulse Resp SpO2 Height Weight   121/65 101 °F (38.3 °C) Oral 114 16 100 % 5' 2\" (1.575 m) 120 lb (54.4 kg)       Physical Exam  Constitutional:       General: She is not in acute distress. Appearance: She is well-developed. She is ill-appearing. She is not toxic-appearing or diaphoretic. HENT:      Head: Normocephalic and atraumatic. Nose: Nose normal.      Mouth/Throat:      Mouth: Mucous membranes are moist.   Eyes:      Pupils: Pupils are equal, round, and reactive to light. Cardiovascular:      Rate and Rhythm: Normal rate and regular rhythm. Heart sounds: No murmur heard. No friction rub. No gallop. Pulmonary:      Effort: Pulmonary effort is normal.      Breath sounds: Normal breath sounds. No wheezing, rhonchi or rales. Abdominal:      General: Bowel sounds are normal. There is no distension. Palpations: Abdomen is soft. Tenderness: There is no abdominal tenderness. Musculoskeletal:         General: No swelling. Cervical back: Normal range of motion. Skin:     General: Skin is warm and dry. Comments: RUE neurovascularly intact. Neurological:      General: No focal deficit present. Mental Status: She is alert and oriented to person, place, and time. DIAGNOSTIC RESULTS     EKG: All EKG's are interpreted by the Emergency Department Physician who either signs or Co-signs this chart in the absence of a cardiologist.        RADIOLOGY:   Non-plain film images such as CT, Ultrasound and MRI are read by the radiologist. Plain radiographic images are visualized and preliminarily interpreted by the emergency physician with the below findings:    Interpretation per the Radiologist below, if available at the time of this note:    XR HUMERUS RIGHT (MIN 2 VIEWS)    (Results Pending)         ED BEDSIDE ULTRASOUND:   Performed by ED Physician - none    LABS:  Labs Reviewed   CBC WITH AUTO DIFFERENTIAL - Abnormal; Notable for the following components:       Result Value    WBC 12.9 (*)     Hematocrit 36.1 (*)     MCV 77.8 (*)     MCH 25.8 (*)     RDW 15.3 (*)     Neutrophils Absolute 9.4 (*)     Monocytes Absolute 1.1 (*)     All other components within normal limits   COMPREHENSIVE METABOLIC PANEL - Abnormal; Notable for the following components:    Sodium 133 (*)     Glucose 124 (*)     Alkaline Phosphatase 137 (*)     All other components within normal limits   LACTIC ACID, PLASMA - Abnormal; Notable for the following components:    Lactic Acid 2.3 (*)     All other components within normal limits   CULTURE, BLOOD 2   CULTURE, BLOOD 1   PROCALCITONIN   URINE DRUG SCREEN   POC PREGNANCY UR-QUAL       All other labs were within normal range or not returned as of this dictation.     EMERGENCY DEPARTMENT COURSE and DIFFERENTIAL DIAGNOSIS/MDM:   Vitals:    Vitals:    08/13/21 0230 08/13/21 0300 08/13/21 0323 08/13/21 0327   BP: 109/65 (!) 106/59     Pulse: 86   115   Resp:       Temp:   100.8 °F (38.2 °C)    TempSrc:   Oral    SpO2: 98% 97%     Weight:       Height:           MDM    Patient is a 25-year-old female who presents to the ED for evaluation of right upper extremity swelling, pain, erythema, fever. She is febrile to 101 and tachycardic to 114 on arrival.  She was given 1 L IV normal saline p.o. Tylenol IV morphine IV Zofran and IV clindamycin in the ED. No fluctuant areas of the RUE requiring I&D, all induration at this time. CBC remarkable for white count of 12.9. Lactic acid 2.3. Cultures procalcitonin drug screen pending. X-ray of the right upper extremity is negative for any subcutaneous air. Patient has remained tachycardic to 115 despite fluid bolus and improvement of fever to 100.8. Concern is for sepsis at this time. Due to patient meeting SIRS criteria with rapid progression of cellulitis patient will be admitted to the medical floor. Dr. Winsome Gutiérrez is accepting physician. Pt stable for admission. REASSESSMENT          CRITICAL CARE TIME   Total Critical Care time was 0 minutes, excluding separately reportable procedures. There was a high probability of clinically significant/life threatening deterioration in the patient's condition which required my urgent intervention. CONSULTS:  None    PROCEDURES:  Unless otherwise noted below, none     Procedures        FINAL IMPRESSION      1. Cellulitis and abscess of upper extremity    2. SIRS (systemic inflammatory response syndrome) ([facility]          DISPOSITION/PLAN   DISPOSITION Admitted 08/13/2021 03:36:49 AM      PATIENT REFERRED TO:  No follow-up provider specified. DISCHARGE MEDICATIONS:  New Prescriptions    No medications on file     Controlled Substances Monitoring:     No flowsheet data found.     (Please note that portions of this note were completed with a voice recognition program.  Efforts were made to edit the dictations but occasionally words are mis-transcribed.)    Kait Trevino PA-C (electronically signed)             Kait Trevino PA-C  08/13/21 [id number]

## 2021-08-13 NOTE — H&P
[facility] MEDICINE    HISTORY AND PHYSICAL EXAM    PATIENT NAME:  Michael Friedman    MRN:  [id number]  SERVICE DATE:  8/13/2021   SERVICE TIME:  4:44 AM    Primary Care Physician: MARICRUZ Charles CNP         SUBJECTIVE  CHIEF COMPLAINT: Fever and right arm pain    HPI: Patient being admitted for cellulitis and abscess of the right upper extremity. Patient is a pleasant, alert and oriented x3,  female, 72-year-old. Patient reports that she noticed redness and swelling of her right bicep 2 days ago. She also states that she is had fevers of 102 that she has been treating with ibuprofen. Patient denies any injury to the arm. Patient states that the pain in the arm is a 10/10. Patient denies any IV drug use. Patient denies any past IV drug use. However, medical record shows past admissions for overdose. Patient reports this is happened once before and she had to have an incision and drainage on her breast.  Patient denies any other medical problems. Patient denies tobacco or alcohol use.     PAST MEDICAL HISTORY:    Past Medical History:   Diagnosis Date    Anxiety     Chronic back pain     LGSIL on Pap smear of cervix 12/4/2020    Ovarian cyst      PAST SURGICAL HISTORY:    Past Surgical History:   Procedure Laterality Date    BREAST SURGERY      mastitis    DILATION AND CURETTAGE OF UTERUS  9-2014    LAPAROSCOPY  1-2015     FAMILY HISTORY:    Family History   Problem Relation Age of Onset    Cancer Paternal Grandmother         lung    High Blood Pressure Father     High Blood Pressure Maternal Grandmother     High Cholesterol Maternal Grandmother     High Blood Pressure Maternal Grandfather     High Cholesterol Maternal Grandfather      SOCIAL HISTORY:    Social History     Socioeconomic History    Marital status: Single     Spouse name: Not on file    Number of children: Not on file    Years of education: Not on file    Highest education level: Not on file Occupational History    Not on file   Tobacco Use    Smoking status: Former Smoker     Packs/day: 0.50     Types: Cigarettes    Smokeless tobacco: Never Used   Substance and Sexual Activity    Alcohol use: Not Currently     Alcohol/week: 0.0 standard drinks    Drug use: Never     Types: Opiates      Comment: heroin    Sexual activity: Yes     Partners: Female, Male     Birth control/protection: I.U.D. Other Topics Concern    Not on file   Social History Narrative    Not on file     Social Determinants of Health     Financial Resource Strain:     Difficulty of Paying Living Expenses:    Food Insecurity:     Worried About Running Out of Food in the Last Year:     [address] in the Last Year:    Transportation Needs:     Lack of Transportation (Medical):  Lack of Transportation (Non-Medical):    Physical Activity:     Days of Exercise per Week:     Minutes of Exercise per Session:    Stress:     Feeling of Stress :    Social Connections:     Frequency of Communication with Friends and Family:     Frequency of Social Gatherings with Friends and Family:     Attends Shinto Services:     Active Member of Clubs or Organizations:     Attends Club or Organization Meetings:     Marital Status:    Intimate Partner Violence:     Fear of Current or Ex-Partner:     Emotionally Abused:     Physically Abused:     Sexually Abused:      MEDICATIONS:   Prior to Admission medications    Not on File       ALLERGIES: Patient has no known allergies. REVIEW OF SYSTEM:   Review of Systems   Constitutional: Positive for fever. Negative for appetite change, fatigue and unexpected weight change. HENT: Negative for congestion, rhinorrhea and sore throat. Eyes: Negative. Negative for photophobia and visual disturbance. Respiratory: Negative for shortness of breath and wheezing. Cardiovascular: Negative for chest pain. Gastrointestinal: Negative for abdominal pain, nausea and vomiting. Endocrine: Negative. Negative for polydipsia, polyphagia and polyuria. Genitourinary: Negative for difficulty urinating, dysuria and pelvic pain. Musculoskeletal: Negative for back pain. Skin: Negative for rash. Erythema, edema, pain right upper extremity   Allergic/Immunologic: Negative. Neurological: Negative. Negative for dizziness, speech difficulty and weakness. Hematological: Negative. Psychiatric/Behavioral: Negative. Negative for behavioral problems and confusion. OBJECTIVE  PHYSICAL EXAM: BP (!) 105/52   Pulse 93   Temp 100 °F (37.8 °C) (Oral)   Resp 16   Ht 5' 2\" (1.575 m)   Wt 120 lb (54.4 kg)   SpO2 96%   BMI 21.95 kg/m²     Physical Exam  Vitals and nursing note reviewed. Constitutional:       General: She is not in acute distress. Appearance: She is well-developed. HENT:      Head: Normocephalic. Right Ear: External ear normal.      Left Ear: External ear normal.      Nose: Nose normal.      Mouth/Throat:      Mouth: Mucous membranes are moist.   Eyes:      Pupils: Pupils are equal, round, and reactive to light. Cardiovascular:      Rate and Rhythm: Normal rate and regular rhythm. Pulses: Normal pulses. Heart sounds: Normal heart sounds. Pulmonary:      Effort: Pulmonary effort is normal. No respiratory distress. Breath sounds: Normal breath sounds. No wheezing or rales. Abdominal:      General: Bowel sounds are normal. There is no distension. Palpations: Abdomen is soft. There is no mass. Tenderness: There is no abdominal tenderness. There is no guarding or rebound. Hernia: No hernia is present. Musculoskeletal:         General: Normal range of motion. Cervical back: Normal range of motion. Right lower leg: No edema. Left lower leg: No edema. Skin:     General: Skin is warm and dry. Capillary Refill: Capillary refill takes less than 2 seconds. Findings: Erythema present. Neurological:      Mental Status: She is alert and oriented to person, place, and time. DATA:     Diagnostic tests reviewed for today's visit:    Most recent labs and imaging results reviewed. LABS:    Recent Results (from the past 24 hour(s))   CBC Auto Differential    Collection Time: 08/12/21  9:30 PM   Result Value Ref Range    WBC 12.9 (H) 4.8 - 10.8 K/uL    RBC 4.64 4.20 - 5.40 M/uL    Hemoglobin 12.0 12.0 - 16.0 g/dL    Hematocrit 36.1 (L) 37.0 - 47.0 %    MCV 77.8 (L) 82.0 - 100.0 fL    MCH 25.8 (L) 27.0 - 31.3 pg    MCHC 33.2 33.0 - 37.0 %    RDW 15.3 (H) 11.5 - 14.5 %    Platelets 502 062 - 319 K/uL    Neutrophils % 72.7 %    Lymphocytes % 17.9 %    Monocytes % 8.8 %    Eosinophils % 0.3 %    Basophils % 0.3 %    Neutrophils Absolute 9.4 (H) 1.4 - 6.5 K/uL    Lymphocytes Absolute 2.3 1.0 - 4.8 K/uL    Monocytes Absolute 1.1 (H) 0.2 - 0.8 K/uL    Eosinophils Absolute 0.0 0.0 - 0.7 K/uL    Basophils Absolute 0.0 0.0 - 0.2 K/uL   Comprehensive Metabolic Panel    Collection Time: 08/12/21  9:30 PM   Result Value Ref Range    Sodium 133 (L) 135 - 144 mEq/L    Potassium 4.1 3.4 - 4.9 mEq/L    Chloride 96 95 - 107 mEq/L    CO2 26 20 - 31 mEq/L    Anion Gap 11 9 - 15 mEq/L    Glucose 124 (H) 70 - 99 mg/dL    BUN 7 6 - 20 mg/dL    CREATININE 0.65 0.50 - 0.90 mg/dL    GFR Non-African American >60.0 >60    GFR  >60.0 >60    Calcium 8.9 8.5 - 9.9 mg/dL    Total Protein 7.2 6.3 - 8.0 g/dL    Albumin 3.8 3.5 - 4.6 g/dL    Total Bilirubin 0.6 0.2 - 0.7 mg/dL    Alkaline Phosphatase 137 (H) 40 - 130 U/L    ALT 9 0 - 33 U/L    AST 13 0 - 35 U/L    Globulin 3.4 2.3 - 3.5 g/dL   Lactic Acid, Plasma    Collection Time: 08/12/21  9:30 PM   Result Value Ref Range    Lactic Acid 2.3 (H) 0.5 - 2.2 mmol/L       IMAGING:  No results found.     VTE Prophylaxis: low molecular weight heparin -  start    ASSESSMENT AND PLAN    Principal Problem:  Sepsis related to cellulitis/abscess: Cellulitis with developing abscess right upper extremity. WBC is 12.9. Lactic acid 2.3. Temperature 101 in ED. Tachycardia at 114. Patient given 1 L normal saline and given 600 mg clindamycin IV in ED. Blood culture sent x2. We will get procalcitonin. We will administer vancomycin pending ID recommendations and provide antipyretics. We will repeat lactic acid x2. We will hydrate per sepsis protocol. We will consult ID. Pending ID recommendations we will consult general surgery for incision and drainage. We will follow blood cultures. Plan of care discussed with: patient    SIGNATURE: MARICRUZ Henderson - CNP  DATE: August 13, 2021  TIME: 4:44 AM     WENDIE Sanderson MD - supervising physician

## 2021-08-13 NOTE — PROGRESS NOTES
Assessment completed this shift. Alert and oriented x4. Up independent in room. RUE +1 non pitting edema. Redness and warmth noted. Patient currently off floor for procedure.   Electronically signed by Sarah Kramer RN on 8/13/2021 at 11:23 AM

## 2021-08-13 NOTE — PROGRESS NOTES
Patient reurned to floor this afternoon. Dressing to right upper arm clean, dry, and intact.   Electronically signed by Nasra Felder RN on 8/13/2021 at 6:06 PM

## 2021-08-14 VITALS
RESPIRATION RATE: 16 BRPM | HEIGHT: 62 IN | OXYGEN SATURATION: 98 % | TEMPERATURE: 98.6 F | BODY MASS INDEX: 19.01 KG/M2 | SYSTOLIC BLOOD PRESSURE: 110 MMHG | WEIGHT: 103.3 LBS | DIASTOLIC BLOOD PRESSURE: 71 MMHG | HEART RATE: 78 BPM

## 2021-08-14 LAB
ALBUMIN SERPL-MCNC: 2.7 G/DL (ref 3.5–4.6)
ALP BLD-CCNC: 109 U/L (ref 40–130)
ALT SERPL-CCNC: 6 U/L (ref 0–33)
ANION GAP SERPL CALCULATED.3IONS-SCNC: 10 MEQ/L (ref 9–15)
AST SERPL-CCNC: 9 U/L (ref 0–35)
BASOPHILS ABSOLUTE: 0 K/UL (ref 0–0.2)
BASOPHILS RELATIVE PERCENT: 0.3 %
BILIRUB SERPL-MCNC: 0.4 MG/DL (ref 0.2–0.7)
BUN BLDV-MCNC: 4 MG/DL (ref 6–20)
CALCIUM SERPL-MCNC: 8 MG/DL (ref 8.5–9.9)
CHLORIDE BLD-SCNC: 105 MEQ/L (ref 95–107)
CO2: 23 MEQ/L (ref 20–31)
CREAT SERPL-MCNC: 0.45 MG/DL (ref 0.5–0.9)
EOSINOPHILS ABSOLUTE: 0.1 K/UL (ref 0–0.7)
EOSINOPHILS RELATIVE PERCENT: 0.8 %
GFR AFRICAN AMERICAN: >60
GFR NON-AFRICAN AMERICAN: >60
GLOBULIN: 3.1 G/DL (ref 2.3–3.5)
GLUCOSE BLD-MCNC: 100 MG/DL (ref 70–99)
HCT VFR BLD CALC: 26.7 % (ref 37–47)
HEMOGLOBIN: 8.9 G/DL (ref 12–16)
LYMPHOCYTES ABSOLUTE: 2.5 K/UL (ref 1–4.8)
LYMPHOCYTES RELATIVE PERCENT: 20.6 %
MAGNESIUM: 1.9 MG/DL (ref 1.7–2.4)
MCH RBC QN AUTO: 26.1 PG (ref 27–31.3)
MCHC RBC AUTO-ENTMCNC: 33.2 % (ref 33–37)
MCV RBC AUTO: 78.5 FL (ref 82–100)
MONOCYTES ABSOLUTE: 0.9 K/UL (ref 0.2–0.8)
MONOCYTES RELATIVE PERCENT: 7.3 %
NEUTROPHILS ABSOLUTE: 8.7 K/UL (ref 1.4–6.5)
NEUTROPHILS RELATIVE PERCENT: 71 %
PDW BLD-RTO: 15.2 % (ref 11.5–14.5)
PLATELET # BLD: 233 K/UL (ref 130–400)
POTASSIUM REFLEX MAGNESIUM: 3.4 MEQ/L (ref 3.4–4.9)
RBC # BLD: 3.4 M/UL (ref 4.2–5.4)
SODIUM BLD-SCNC: 138 MEQ/L (ref 135–144)
TOTAL PROTEIN: 5.8 G/DL (ref 6.3–8)
VANCOMYCIN RANDOM: 7.1 UG/ML (ref 10–40)
WBC # BLD: 12.3 K/UL (ref 4.8–10.8)

## 2021-08-14 PROCEDURE — 85025 COMPLETE CBC W/AUTO DIFF WBC: CPT

## 2021-08-14 PROCEDURE — 36415 COLL VENOUS BLD VENIPUNCTURE: CPT

## 2021-08-14 PROCEDURE — 2580000003 HC RX 258: Performed by: SURGERY

## 2021-08-14 PROCEDURE — 6360000002 HC RX W HCPCS: Performed by: INTERNAL MEDICINE

## 2021-08-14 PROCEDURE — 6370000000 HC RX 637 (ALT 250 FOR IP): Performed by: SURGERY

## 2021-08-14 PROCEDURE — 80053 COMPREHEN METABOLIC PANEL: CPT

## 2021-08-14 PROCEDURE — G0378 HOSPITAL OBSERVATION PER HR: HCPCS

## 2021-08-14 PROCEDURE — 83735 ASSAY OF MAGNESIUM: CPT

## 2021-08-14 PROCEDURE — 96366 THER/PROPH/DIAG IV INF ADDON: CPT

## 2021-08-14 PROCEDURE — 2580000003 HC RX 258: Performed by: ANESTHESIOLOGY

## 2021-08-14 PROCEDURE — 96372 THER/PROPH/DIAG INJ SC/IM: CPT

## 2021-08-14 PROCEDURE — 6360000002 HC RX W HCPCS: Performed by: SURGERY

## 2021-08-14 PROCEDURE — 2580000003 HC RX 258: Performed by: INTERNAL MEDICINE

## 2021-08-14 PROCEDURE — 80202 ASSAY OF VANCOMYCIN: CPT

## 2021-08-14 RX ADMIN — Medication 5 ML: at 08:36

## 2021-08-14 RX ADMIN — VANCOMYCIN HYDROCHLORIDE 1000 MG: 1 INJECTION, POWDER, LYOPHILIZED, FOR SOLUTION INTRAVENOUS at 09:38

## 2021-08-14 RX ADMIN — Medication 10 ML: at 08:35

## 2021-08-14 RX ADMIN — HYDROCODONE BITARTRATE AND ACETAMINOPHEN 1 TABLET: 5; 325 TABLET ORAL at 08:35

## 2021-08-14 RX ADMIN — PIPERACILLIN AND TAZOBACTAM 3375 MG: 3; .375 INJECTION, POWDER, LYOPHILIZED, FOR SOLUTION INTRAVENOUS at 15:37

## 2021-08-14 RX ADMIN — TRAMADOL HYDROCHLORIDE 50 MG: 50 TABLET, FILM COATED ORAL at 01:50

## 2021-08-14 RX ADMIN — ENOXAPARIN SODIUM 40 MG: 100 INJECTION SUBCUTANEOUS at 08:37

## 2021-08-14 RX ADMIN — PIPERACILLIN AND TAZOBACTAM 3375 MG: 3; .375 INJECTION, POWDER, LYOPHILIZED, FOR SOLUTION INTRAVENOUS at 06:18

## 2021-08-14 RX ADMIN — SODIUM CHLORIDE: 9 INJECTION, SOLUTION INTRAVENOUS at 01:51

## 2021-08-14 ASSESSMENT — PAIN DESCRIPTION - ORIENTATION: ORIENTATION: RIGHT

## 2021-08-14 ASSESSMENT — PAIN SCALES - GENERAL
PAINLEVEL_OUTOF10: 0
PAINLEVEL_OUTOF10: 6
PAINLEVEL_OUTOF10: 6
PAINLEVEL_OUTOF10: 7
PAINLEVEL_OUTOF10: 0

## 2021-08-14 ASSESSMENT — PAIN DESCRIPTION - LOCATION: LOCATION: ARM

## 2021-08-14 ASSESSMENT — PAIN DESCRIPTION - FREQUENCY: FREQUENCY: INTERMITTENT

## 2021-08-14 ASSESSMENT — PAIN DESCRIPTION - DESCRIPTORS: DESCRIPTORS: ACHING;SHARP

## 2021-08-14 ASSESSMENT — PAIN DESCRIPTION - PAIN TYPE: TYPE: ACUTE PAIN

## 2021-08-14 ASSESSMENT — PAIN DESCRIPTION - ONSET: ONSET: GRADUAL

## 2021-08-14 NOTE — FLOWSHEET NOTE
[address] [facility]  assessment completed. Pt :  Awake and resting in bed             Alert and oriented. Breakfast: set up with breakfast  RESP:   Even and non labored            Lung sounds:       clear                          Oxygen: room air  Complaints of: right upper arm discomfort  Pain: medicated with prn norco for pain and discomfort  IV: SL  TELE: none  Dressings: right upper arm, dry and intact  Precautions:              Falls:   20     Jim: 22  Chart and meds reviewed. Noted Labs: h 8.9 hct 26.7  Plan for today: pt asking about discharge plans, await physician rounds. Call light in reach. Electronically signed by Brittny Lemus RN on 8/14/2021 at 10:18 AM    [phone] iv removed. Pt states she is leaving AMA for childcare reasons. Taxi voucher requested. Dr DUNN [facility] [employer] updated. Electronically signed by Brittny Lemus RN on 8/14/2021 at 4:52 PM    1728 dinner completed. Personal belongings gathered. Pt talking to family member in lobby. Ambulatory off unit. Taxi transport cancelled.  Electronically signed by Brittny Lemus RN on 8/14/2021 at 5:35 PM

## 2021-08-14 NOTE — PROGRESS NOTES
HPI Comments: Janet Pinedo is a 39 y.o. female, pmhx significant for depression, PVC's, pneumonia who presents ambulatory to the ED c/o sudden onset palpitations and CP radiating to the jaw x 2 episodes with associated SOB, lightheadedness, and cough x 0800 this morning. Pt reports that she has a hx of PVC's and after climbing the flight of stairs to get to her job, she sat down at her desk and noticed the onset of her sxs, noting that they were similar to when she last had a run of PVC's. However, she states that today her sxs were more severe than when she has previously experienced them. The episode lasted for 5-10 minutes and then she felt better with some residual fatigue and lightheadedness. She decided to stay and continue working, but she experienced another episode later in the day that was not as severe but lasted for the same amount of time, so she decided to come into the ED [facility] ED for evaluation. Upon arrival, pt states that she continues to feel fatigued and lightheaded. Of note, pt has seen a cardiologist in the past and received a stress test with normal results. Additionally of note, she reports that she has had 2 episodes of chest tightness in the middle of the night over the past month, but thinks that it is possibly from sleeping in a bad position. Pt endorses taking her daily medications and denies any medication changes recently. She specifically denies increased caffeine intake, taking sudafed today, calf pain, taking hormone replacements or recent travel. PCP: None      Social Hx: -tobacco (former), -EtOH, -Illicit Drugs   FHx: pertinent negative for DVT/PE   Medication Allergies: codeine, morphine, prednisone       There are no other complaints, changes, or physical findings at this time. The history is provided by the patient.         Past Medical History:   Diagnosis Date    Arthritis     Bronchitis     Depression     Ear infection     Elevated hemoglobin A1c     Insomnia Pharmacy Vancomycin Consult     Vancomycin Day: 2  Current Dosin mg Q8h    Recent Labs     21  0505 21  0628   BUN 6 4*   CREATININE 0.48* 0.45*   WBC 14.2* 12.3*       Intake/Output Summary (Last 24 hours) at 2021 0850  Last data filed at 2021 0654  Gross per 24 hour   Intake 3614 ml   Output 1800 ml   Net 1814 ml     Cultures  Recent Labs     21  1215 21  1058 21  0106   BC  --   --  No Growth to date. Any change in status will be called. BLOODCULT2  --   --  No Growth to date. Any change in status will be called. LABGRAM Many WBC's  Many Gram positive cocci in pairs, short chains  Many Small Gram positive rods  Many Gram negative rods    --   --    COVID19  --  Not Detected  --      Height: 5' 2\" (157.5 cm), Weight: 103 lb 4.8 oz (46.9 kg), Body mass index is 18.89 kg/m². Estimated Creatinine Clearance: 135 mL/min (A) (based on SCr of 0.45 mg/dL (L)). .    Trough:  Recent Labs     21  0628   VANCORANDOM 7.1*      Assessment/Plan:  Current dosing with latest level of 7.1 has resulted in predicted levels of  at steady state which is subtherapeutic. [facility] is unable to find suitable regimen and will not calculate further AUC and toxicity calculation at this time. Given that this regimen is predicted to be slightly under target range of 400-500 will increase dosing to 1000 mg Q8h and obtain random level in 24-48 hours on  @ 0600.      Thank you,    Delphine Patricio, PharmD, BCPS, [facility]  Staff Pharmacist  2021 8:56 AM  Migraine     Obstructive sleep apnea     Pneumonia     Sinus infection        Past Surgical History:   Procedure Laterality Date    HX HYSTERECTOMY           History reviewed. No pertinent family history. Social History     Social History    Marital status: SINGLE     Spouse name: N/A    Number of children: N/A    Years of education: N/A     Occupational History    Not on file. Social History Main Topics    Smoking status: Former Smoker    Smokeless tobacco: Never Used    Alcohol use No    Drug use: No    Sexual activity: Not on file     Other Topics Concern    Not on file     Social History Narrative         ALLERGIES: Codeine; Morphine; and Prednisone    Review of Systems   Constitutional: Positive for fatigue. Negative for activity change, chills and fever. HENT: Negative for congestion and sore throat. Eyes: Negative for pain and redness. Respiratory: Positive for cough, chest tightness (x2 episodes in past month) and shortness of breath. Cardiovascular: Positive for chest pain (radiating to the jaw) and palpitations. Gastrointestinal: Negative for abdominal pain, diarrhea, nausea and vomiting. Genitourinary: Negative for dysuria, frequency and urgency. Musculoskeletal: Negative for back pain and neck pain. No calf pain    Skin: Negative for rash. Neurological: Positive for light-headedness. Negative for syncope and headaches. Psychiatric/Behavioral: Negative for confusion. All other systems reviewed and are negative. Patient Vitals for the past 12 hrs:   Temp Pulse Resp BP SpO2   06/13/17 1533 - 81 15 - 96 %   06/13/17 1530 - 79 17 (!) 145/94 -   06/13/17 1309 98.3 °F (36.8 °C) 83 20 (!) 198/94 98 %         Physical Exam   Constitutional: She is oriented to person, place, and time. She appears well-developed and well-nourished. No distress. HENT:   Head: Normocephalic.    Nose: Nose normal.   Mouth/Throat: Oropharynx is clear and moist. No oropharyngeal exudate. Eyes: Conjunctivae are normal. Pupils are equal, round, and reactive to light. No scleral icterus. Neck: Normal range of motion. Neck supple. No JVD present. No tracheal deviation present. No thyromegaly present. Cardiovascular: Normal rate, regular rhythm and intact distal pulses. Exam reveals no gallop and no friction rub. No murmur heard. Pulmonary/Chest: Effort normal and breath sounds normal. No stridor. No respiratory distress. She has no wheezes. She has no rales. Abdominal: Soft. Bowel sounds are normal. She exhibits no distension. There is no tenderness. There is no rebound and no guarding. Musculoskeletal: Normal range of motion. She exhibits no edema. Lymphadenopathy:     She has no cervical adenopathy. Neurological: She is alert and oriented to person, place, and time. No cranial nerve deficit. She exhibits normal muscle tone. Coordination normal.   Skin: Skin is warm and dry. No rash noted. She is not diaphoretic. No erythema. Psychiatric: She has a normal mood and affect. Her behavior is normal.   Nursing note and vitals reviewed.        MDM  Number of Diagnoses or Management Options  Diagnosis management comments: DDx: PVC's, dysrhythmia, anxiety, metabolic abnormality, ACS       Amount and/or Complexity of Data Reviewed  Clinical lab tests: reviewed and ordered  Tests in the radiology section of CPT®: ordered and reviewed  Tests in the medicine section of CPT®: reviewed and ordered  Review and summarize past medical records: yes  Independent visualization of images, tracings, or specimens: yes    Patient Progress  Patient progress: stable    ED Course       Procedures  DISCHARGE NOTE:  LABORATORY TESTS:  Recent Results (from the past 12 hour(s))   EKG, 12 LEAD, INITIAL    Collection Time: 06/13/17  1:02 PM   Result Value Ref Range    Ventricular Rate 80 BPM    Atrial Rate 80 BPM    P-R Interval 148 ms    QRS Duration 78 ms    Q-T Interval 404 ms    QTC Calculation (Bezet) 465 ms    Calculated P Axis -5 degrees    Calculated R Axis 29 degrees    Calculated T Axis 48 degrees    Diagnosis       Normal sinus rhythm  Normal ECG  When compared with ECG of 16-OCT-2015 06:47,  No significant change was found     CBC WITH AUTOMATED DIFF    Collection Time: 06/13/17  1:54 PM   Result Value Ref Range    WBC 6.9 3.6 - 11.0 K/uL    RBC 4.87 3.80 - 5.20 M/uL    HGB 14.4 11.5 - 16.0 g/dL    HCT 40.5 35.0 - 47.0 %    MCV 83.2 80.0 - 99.0 FL    MCH 29.6 26.0 - 34.0 PG    MCHC 35.6 30.0 - 36.5 g/dL    RDW 12.8 11.5 - 14.5 %    PLATELET 130 499 - 593 K/uL    NEUTROPHILS 55 32 - 75 %    LYMPHOCYTES 35 12 - 49 %    MONOCYTES 7 5 - 13 %    EOSINOPHILS 3 0 - 7 %    BASOPHILS 0 0 - 1 %    ABS. NEUTROPHILS 3.8 1.8 - 8.0 K/UL    ABS. LYMPHOCYTES 2.4 0.8 - 3.5 K/UL    ABS. MONOCYTES 0.5 0.0 - 1.0 K/UL    ABS. EOSINOPHILS 0.2 0.0 - 0.4 K/UL    ABS. BASOPHILS 0.0 0.0 - 0.1 K/UL   METABOLIC PANEL, COMPREHENSIVE    Collection Time: 06/13/17  1:54 PM   Result Value Ref Range    Sodium 134 (L) 136 - 145 mmol/L    Potassium 3.9 3.5 - 5.1 mmol/L    Chloride 99 97 - 108 mmol/L    CO2 28 21 - 32 mmol/L    Anion gap 7 5 - 15 mmol/L    Glucose 232 (H) 65 - 100 mg/dL    BUN 10 6 - 20 MG/DL    Creatinine 0.67 0.55 - 1.02 MG/DL    BUN/Creatinine ratio 15 12 - 20      GFR est AA >60 >60 ml/min/1.73m2    GFR est non-AA >60 >60 ml/min/1.73m2    Calcium 9.5 8.5 - 10.1 MG/DL    Bilirubin, total 0.4 0.2 - 1.0 MG/DL    ALT (SGPT) 52 12 - 78 U/L    AST (SGOT) 32 15 - 37 U/L    Alk.  phosphatase 90 45 - 117 U/L    Protein, total 7.3 6.4 - 8.2 g/dL    Albumin 3.8 3.5 - 5.0 g/dL    Globulin 3.5 2.0 - 4.0 g/dL    A-G Ratio 1.1 1.1 - 2.2     CK W/ REFLX CKMB    Collection Time: 06/13/17  1:54 PM   Result Value Ref Range    CK 62 26 - 192 U/L   TROPONIN I    Collection Time: 06/13/17  1:54 PM   Result Value Ref Range    Troponin-I, Qt. <0.04 <0.05 ng/mL   D DIMER    Collection Time: 06/13/17  3:51 PM   Result Value Ref Range    D-dimer 0.19 0.00 - 0.65 mg/L FEU   URINALYSIS W/MICROSCOPIC    Collection Time: 06/13/17  4:06 PM   Result Value Ref Range    Color YELLOW/STRAW      Appearance CLEAR CLEAR      Specific gravity 1.025 1.003 - 1.030      pH (UA) 6.0 5.0 - 8.0      Protein NEGATIVE  NEG mg/dL    Glucose NEGATIVE  NEG mg/dL    Ketone TRACE (A) NEG mg/dL    Bilirubin NEGATIVE  NEG      Blood NEGATIVE  NEG      Urobilinogen 0.2 0.2 - 1.0 EU/dL    Nitrites NEGATIVE  NEG      Leukocyte Esterase NEGATIVE  NEG      WBC 5-10 0 - 4 /hpf    RBC 5-10 0 - 5 /hpf    Epithelial cells MODERATE (A) FEW /lpf    Bacteria 2+ (A) NEG /hpf       IMAGING RESULTS:  CXR Results  (Last 48 hours)               06/13/17 1521  XR CHEST PORT Final result    Impression:  IMPRESSION: Normal chest.               Narrative:  EXAM:  XR CHEST PORT       INDICATION:  Intermittent palpitations today. Associated shortness of breath and   jaw pain. COMPARISON:  10/15/2015       FINDINGS: A portable AP radiograph of the chest was obtained at 1503 hours. The lungs are clear. The cardiac and mediastinal contours and pulmonary   vascularity are normal.  The bones and soft tissues are grossly within normal   limits. MEDICATIONS GIVEN:  Medications   sodium chloride 0.9 % bolus infusion 1,000 mL (1,000 mL IntraVENous New Bag 6/13/17 1550)       IMPRESSION:  1. Palpitations    2. Dehydration        PLAN:  Current Discharge Medication List        Follow-up Information     Follow up With Details Comments Contact Info    Gianna Jerome MD Schedule an appointment as soon as possible for a visit in 1 week  [address]  [address]  [phone]          Return to ED if worse     DISCHARGE NOTE:  5:06 PM  Pt has been reexamined. Pt has no new complaints, changes, or physical findings. Care plan outlined and precautions discussed. All available results reviewed with pt. All medications reviewed with pt.  All of pts questions and concerns addressed. Pt agrees to f/u as instructed and agrees to return to ED upon further deterioration. Pt is ready to go home. Written by Francisco J Oakes ED Scribe as dictated by Lachelle Patterson MD    ATTESTATION   This note is prepared by Francisco J Oakes acting as scribe for MD Lachelle Nelson MD : The scribe's documentation has been prepared under my direction and personally reviewed by me in its entirety. I confirm that the note above accurately reflects all work, treatment, procedures, and medical decision making performed by me.

## 2021-08-14 NOTE — PROGRESS NOTES
Department of Internal Medicine  General Internal Medicine  Attending Progress Note      SUBJECTIVE:  Pt seen and examined. Sleeping in bed comfortably today.  Awaiting culture results    OBJECTIVE      Medications    Current Facility-Administered Medications: vancomycin 1000 mg IVPB in 250 mL D5W addavial, 1,000 mg, Intravenous, Q8H  sodium chloride flush 0.9 % injection 5-40 mL, 5-40 mL, Intravenous, 2 times per day  sodium chloride flush 0.9 % injection 5-40 mL, 5-40 mL, Intravenous, PRN  0.9 % sodium chloride infusion, 25 mL, Intravenous, PRN  enoxaparin (LOVENOX) injection 40 mg, 40 mg, Subcutaneous, Daily  acetaminophen (TYLENOL) tablet 650 mg, 650 mg, Oral, Q6H PRN **OR** acetaminophen (TYLENOL) suppository 650 mg, 650 mg, Rectal, Q6H PRN  0.9 % sodium chloride infusion, , Intravenous, Continuous  morphine (PF) injection 1 mg, 1 mg, Intravenous, Q4H PRN  traMADol (ULTRAM) tablet 50 mg, 50 mg, Oral, Q6H PRN  vancomycin (VANCOCIN) intermittent dosing (placeholder), , Other, RX Placeholder  sodium chloride flush 0.9 % injection 10 mL, 10 mL, Intravenous, 2 times per day  sodium chloride flush 0.9 % injection 10 mL, 10 mL, Intravenous, PRN  0.9 % sodium chloride infusion, 25 mL, Intravenous, PRN  HYDROcodone-acetaminophen (NORCO) 5-325 MG per tablet 1 tablet, 1 tablet, Oral, Q6H PRN  piperacillin-tazobactam (ZOSYN) 3,375 mg in dextrose 5 % 50 mL IVPB extended infusion (mini-bag), 3,375 mg, Intravenous, Q8H  Physical    VITALS:  /68   Pulse 93   Temp 98.4 °F (36.9 °C) (Oral)   Resp 20   Ht 5' 2\" (1.575 m)   Wt 103 lb 4.8 oz (46.9 kg)   SpO2 98%   BMI 18.89 kg/m²   Constitutional: Lying in bed comfortably  Head: Normocephalic, atraumatic  Eyes: EOMI, PERRLA  ENT: moist mucous membranes  Neck: neck supple, trachea midline  Lungs: Good inspiratory effort, CTABL, no wheeze, no rhonchi, no rales  Heart: RRR, normal S1 and S2, no murmurs  GI: Soft, non-distended, non tender, no guarding, no rebound, +BS  MSK: RUE with surgical dressing and guaze in place c/d/i, no edema noted  Pulses: 2+ pulses bilaterally  Skin: warm, dry  Psych: appropriate affect     Data    CBC:   Lab Results   Component Value Date    WBC 12.3 08/14/2021    RBC 3.40 08/14/2021    RBC 4.32 01/23/2012    HGB 8.9 08/14/2021    HCT 26.7 08/14/2021    MCV 78.5 08/14/2021    MCH 26.1 08/14/2021    MCHC 33.2 08/14/2021    RDW 15.2 08/14/2021     08/14/2021    MPV 8.6 07/29/2015     CMP:    Lab Results   Component Value Date     08/14/2021    K 3.4 08/14/2021     08/14/2021    CO2 23 08/14/2021    BUN 4 08/14/2021    CREATININE 0.45 08/14/2021    GFRAA >60.0 08/14/2021    LABGLOM >60.0 08/14/2021    GLUCOSE 100 08/14/2021    GLUCOSE 79 01/23/2012    PROT 5.8 08/14/2021    LABALBU 2.7 08/14/2021    LABALBU 4.9 01/23/2012    CALCIUM 8.0 08/14/2021    BILITOT 0.4 08/14/2021    ALKPHOS 109 08/14/2021    AST 9 08/14/2021    ALT 6 08/14/2021       ASSESSMENT AND PLAN      # Sepsis 2/2 RUE abscess/cellulitis  - febrile, leukocytosis, elevated lactate  - sp I&D 8/13 by surgery - very extensive and severe abscess  - started on vanco. Added zosyn per ID recs  - monitor cultures- GNR preliminarily  - pain control    Disposition: Vanc/zosyn and awaiting cultures.  Abx per ID recs      Brian Santizo DO  Internal Medicine

## 2021-08-15 LAB
ANAEROBIC CULTURE: ABNORMAL
CULTURE SURGICAL: ABNORMAL
GRAM STAIN RESULT: ABNORMAL
ORGANISM: ABNORMAL

## 2021-08-16 ENCOUNTER — CARE COORDINATION (OUTPATIENT)
Dept: CASE MANAGEMENT | Age: 31
End: 2021-08-16

## 2021-08-16 NOTE — CARE COORDINATION
[facility] Transitions Initial Follow Up Call    Call within 2 business days of discharge: Yes    Patient: Anshu Valdez Patient : 1990   MRN: [id number]  Reason for Admission: 2021 - 2021 R arm I&D, Cellulitis/abscess. AMA. Discharge Date: 21 RARS: Readmission Risk Score: 9  NR  CT    Last Discharge [facility]       Complaint Diagnosis Description Type Department Provider    21 Abscess Cellulitis and abscess of upper extremity . .. ED to Hosp-Admission (Discharged) (ADMITTED) [facility] Ades, DO; Franchesca Space,... Initial CT outreach attempt. Left [facility] compliant VM and advised to schedule PCP appt. Care Transitions 24 Hour Call    Care Transitions Interventions         Follow Up  No future appointments.     Howard Hansen RN

## 2021-08-17 ENCOUNTER — CARE COORDINATION (OUTPATIENT)
Dept: CASE MANAGEMENT | Age: 31
End: 2021-08-17

## 2021-08-17 NOTE — CARE COORDINATION
[facility] Transitions Initial Follow Up Call    Call within 2 business days of discharge: Yes    Patient: Sarahi Allen Patient : 1990   MRN: [id number]  Reason for Admission: 2021 - 2021 R arm I&D, Cellulitis/abscess. AMA. Discharge Date: 21 RARS: Readmission Risk Score: 9      Last Discharge [facility]       Complaint Diagnosis Description Type Department Provider    21 Abscess Cellulitis and abscess of upper extremity . .. ED to Hosp-Admission (Discharged) (ADMITTED) [facility] TAYLOR Moore, DO; Kylee Presley,... Second initial CT outreach attempt. Left Hippa compliant VM. CTN s/o. Care Transitions 24 Hour Call    Care Transitions Interventions         Follow Up  No future appointments.     Muriel Avila RN

## 2021-08-18 LAB
BLOOD CULTURE, ROUTINE: NORMAL
CULTURE, BLOOD 2: NORMAL

## 2021-08-19 NOTE — PROGRESS NOTES
Physician Progress Note      Louise Rosales  CSN #:                  [id number]  :                       1990  ADMIT DATE:       2021 12:09 AM  DISCH DATE:        2021 5:28 PM  RESPONDING  PROVIDER #:        Jeanette Hopkins MD          QUERY TEXT:    Patient admitted with abscess of arm . Per Op note dated 21 documentation   of I&D. To accurately reflect the procedure performed please further specify the depth   of tissue incised and drained: The medical record reflects the following:  Risk Factors: right arm abscess  Clinical Indicators:  \"op note states It was cultured and the wound was then   irrigated with saline solution and the abscess appeared to go intramuscularly   in the upper arm region  Treatment: I&D  Options provided:  -- Skin only  -- Subcutaneous tissue  -- Fascia  -- Muscle  -- Other - I will add my own diagnosis  -- Disagree - Not applicable / Not valid  -- Disagree - Clinically unable to determine / Unknown  -- Refer to Clinical Documentation Reviewer    PROVIDER RESPONSE TEXT:    Addendum to 21 procedure note: The depth of the drainage to right arm was   down to and including muscle.     Query created by: Clementina Lao on 2021 9:18 AM      Electronically signed by:  Jeanette Hopkins MD 2021 9:28 AM
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
## (undated) DEVICE — GLOVE ORANGE PI 8   MSG9080

## (undated) DEVICE — SUTURE VCRL SZ 2-0 L36IN ABSRB UD L36MM CT-1 1/2 CIR J945H

## (undated) DEVICE — ELECTRODE PT RET AD L9FT HI MOIST COND ADH HYDRGEL CORDED

## (undated) DEVICE — SPONGE,LAP,18"X18",DLX,XR,ST,5/PK,40/PK: Brand: MEDLINE

## (undated) DEVICE — ADHESIVE SKIN CLSR 0.7ML TOP DERMBND ADV

## (undated) DEVICE — LABEL MED MINI W/ MARKER

## (undated) DEVICE — TOWEL,OR,DSP,ST,BLUE,STD,4/PK,20PK/CS: Brand: MEDLINE

## (undated) DEVICE — NEPTUNE E-SEP SMOKE EVACUATION PENCIL, COATED, 70MM BLADE, PUSH BUTTON SWITCH: Brand: NEPTUNE E-SEP

## (undated) DEVICE — APPLICATOR MEDICATED 26 CC SOLUTION HI LT ORNG CHLORAPREP

## (undated) DEVICE — GAUZE,PACKING STRIP,PLAIN,1"X5YD,STRL,LF: Brand: CURAD

## (undated) DEVICE — HAND II: Brand: MEDLINE INDUSTRIES, INC.

## (undated) DEVICE — SUTURE MCRYL SZ 4-0 L27IN ABSRB UD L19MM PS-2 1/2 CIR PRIM Y426H

## (undated) DEVICE — GOWN,AURORA,NONREINFORCED,LARGE: Brand: MEDLINE

## (undated) DEVICE — COVER LT HNDL BLU PLAS